# Patient Record
Sex: FEMALE | Race: WHITE | NOT HISPANIC OR LATINO | Employment: FULL TIME | URBAN - METROPOLITAN AREA
[De-identification: names, ages, dates, MRNs, and addresses within clinical notes are randomized per-mention and may not be internally consistent; named-entity substitution may affect disease eponyms.]

---

## 2017-01-11 ENCOUNTER — ROUTINE PRENATAL (OUTPATIENT)
Dept: OBSTETRICS AND GYNECOLOGY | Facility: CLINIC | Age: 32
End: 2017-01-11
Attending: OBSTETRICS & GYNECOLOGY
Payer: COMMERCIAL

## 2017-01-11 VITALS — DIASTOLIC BLOOD PRESSURE: 70 MMHG | WEIGHT: 152.56 LBS | BODY MASS INDEX: 23.2 KG/M2 | SYSTOLIC BLOOD PRESSURE: 110 MMHG

## 2017-01-11 DIAGNOSIS — O09.92 SUPERVISION OF HIGH RISK PREGNANCY, ANTEPARTUM, SECOND TRIMESTER: Primary | ICD-10-CM

## 2017-01-11 PROCEDURE — 0502F SUBSEQUENT PRENATAL CARE: CPT | Mod: S$GLB,,, | Performed by: OBSTETRICS & GYNECOLOGY

## 2017-01-11 PROCEDURE — 99999 PR PBB SHADOW E&M-EST. PATIENT-LVL II: CPT | Mod: PBBFAC,,, | Performed by: OBSTETRICS & GYNECOLOGY

## 2017-01-11 RX ORDER — CYCLOSPORINE 0.5 MG/ML
EMULSION OPHTHALMIC
COMMUNITY
Start: 2016-12-19 | End: 2017-03-29

## 2017-01-11 NOTE — MR AVS SNAPSHOT
[facility] - OB/GYN [address]  [address]  [address]  [facility] [address]  Phone: [phone]  Fax: [phone]                  Samantha Lopez   2017 8:00 AM   Routine Prenatal    Description:  Female : 1985   Provider:  Rasheeda Macias MD   Department:  [facility] - OB/GYN [address]           Reason for Visit     Routine Prenatal Visit           Diagnoses this Visit        Comments    Supervision of high risk pregnancy, antepartum, second trimester    -  Primary            To Do List           Future Appointments        Provider Department Dept Phone    2017 3:15 PM EKG, APPT [facility] [phone]    2017 3:30 PM Be Levine MD [facility]- Cardiology [phone]      Goals (5 Years of Data)     None      Follow-Up and Disposition     Return in about 4 weeks (around 2017).      [facility] On Call     [facility] On Call Nurse Care Line -  Assistance  Registered nurses in the [facility] On Call Center provide clinical advisement, health education, appointment booking, and other advisory services.  Call for this free service at [phone].             Medications           Message regarding Medications     Verify the changes and/or additions to your medication regime listed below are the same as discussed with your clinician today.  If any of these changes or additions are incorrect, please notify your healthcare provider.             Verify that the below list of medications is an accurate representation of the medications you are currently taking.  If none reported, the list may be blank. If incorrect, please contact your healthcare provider. Carry this list with you in case of emergency.           Current Medications     aspirin 81 MG Chew Take 81 mg by mouth once daily.      PNV 11-iron fum-folic acid-om3 (VIVA DHA) 28-1-200 mg Cap Take 1 capsule by mouth once daily.    RESTASIS 0.05 % ophthalmic emulsion     valacyclovir (VALTREX) 500 MG tablet Take 2 tablets (1,000 mg  total) by mouth 2 (two) times daily.           Clinical Reference Information           Prenatal Vitals     Enc. Date GA Prenatal Vitals Prenatal Pulse Pain Level Urine Albumin/Glucose Edema Presentation Dilation/Effacement/Station    17 22w0d 110/70 / 69.2 kg (152 lb 8.9 oz)   0 Negative / Negative None / None / None      16 19w6d 112/74 / 66.3 kg (146 lb 2.6 oz)           16 18w0d 102/74 / 65.3 kg (143 lb 15.4 oz)  / 158 / Absent    None / None      16 14w0d 102/62 / 63 kg (139 lb)  / + / Absent  0 Negative / Negative None / None / None         TW.779 kg (21 lb 8.9 oz)   Pregravid weight: 59.4 kg (131 lb)   Number of fetuses: 1       Vital Signs - Last Recorded  Most recent update: 2017  8:18 AM by Britany Madison LPN    BP Wt LMP BMI       110/70 69.2 kg (152 lb 8.9 oz) 08/10/2016 (Exact Date) 23.2 kg/m2       Allergies as of 2017     No Known Allergies      Immunizations Administered on Date of Encounter - 2017     None

## 2017-01-19 ENCOUNTER — HOSPITAL ENCOUNTER (OUTPATIENT)
Dept: CARDIOLOGY | Facility: CLINIC | Age: 32
Discharge: HOME | End: 2017-01-19
Payer: COMMERCIAL

## 2017-01-19 ENCOUNTER — OFFICE VISIT (OUTPATIENT)
Dept: CARDIOLOGY | Facility: CLINIC | Age: 32
End: 2017-01-19
Payer: COMMERCIAL

## 2017-01-19 VITALS
SYSTOLIC BLOOD PRESSURE: 91 MMHG | HEIGHT: 68 IN | HEART RATE: 77 BPM | OXYGEN SATURATION: 99 % | WEIGHT: 153.88 LBS | BODY MASS INDEX: 23.32 KG/M2 | DIASTOLIC BLOOD PRESSURE: 61 MMHG

## 2017-01-19 DIAGNOSIS — Q24.9 ADULT CONGENITAL HEART DISEASE: Primary | ICD-10-CM

## 2017-01-19 DIAGNOSIS — I37.0 NONRHEUMATIC PULMONARY VALVE STENOSIS: ICD-10-CM

## 2017-01-19 DIAGNOSIS — Q20.1 DOUBLE OUTLET RIGHT VENTRICLE: ICD-10-CM

## 2017-01-19 DIAGNOSIS — Z98.890 PERSONAL HISTORY OF SURGERY TO HEART AND GREAT VESSELS, PRESENTING HAZARDS TO HEALTH: ICD-10-CM

## 2017-01-19 DIAGNOSIS — Z95.2 PULMONARY VALVE REPLACED: ICD-10-CM

## 2017-01-19 PROCEDURE — 99214 OFFICE O/P EST MOD 30 MIN: CPT | Mod: S$GLB,,, | Performed by: PEDIATRICS

## 2017-01-19 PROCEDURE — 99999 PR PBB SHADOW E&M-EST. PATIENT-LVL III: CPT | Mod: PBBFAC,,, | Performed by: PEDIATRICS

## 2017-01-19 PROCEDURE — 1159F MED LIST DOCD IN RCRD: CPT | Mod: S$GLB,,, | Performed by: PEDIATRICS

## 2017-01-19 NOTE — MR AVS SNAPSHOT
[facility]  [address]  [facility] [address]  Phone: [phone]                  Samantha Lopez   2017 3:30 PM   Office Visit    Description:  Female : 1985   Provider:  Be Levine MD   Department:  [facility]                To Do List           Future Appointments        Provider Department Dept Phone    2017 3:20 PM Jose Penn III, MD [facility] - Maternal Fetal Med [phone]    2017 8:00 AM Rasheeda Macias MD [facility] - OB/GYN [address] [phone]      Goals (5 Years of Data)     None      [facility] -  Assistance  Registered nurses in the [facility] Center provide clinical advisement, health education, appointment booking, and other advisory services.  Call for this free service at [phone].             Medications           Message regarding Medications     Verify the changes and/or additions to your medication regime listed below are the same as discussed with your clinician today.  If any of these changes or additions are incorrect, please notify your healthcare provider.             Verify that the below list of medications is an accurate representation of the medications you are currently taking.  If none reported, the list may be blank. If incorrect, please contact your healthcare provider. Carry this list with you in case of emergency.           Current Medications     PNV 11-iron fum-folic acid-om3 (VIVA DHA) 28-1-200 mg Cap Take 1 capsule by mouth once daily.    aspirin 81 MG Chew Take 81 mg by mouth once daily.      RESTASIS 0.05 % ophthalmic emulsion     valacyclovir (VALTREX) 500 MG tablet Take 2 tablets (1,000 mg total) by mouth 2 (two) times daily.           Clinical Reference Information           Prenatal Vitals     Enc. Date GA Prenatal Vitals Prenatal Pulse Pain Level Urine Albumin/Glucose Edema Presentation Dilation/Effacement/Station    17 23w1d 91/61  77       "   17 23w1d 101/62 / 69.8 kg (153 lb 14.1 oz)  77         17 22w0d 110/70 / 69.2 kg (152 lb 8.9 oz)  / + / Present  0 Negative / Negative None / None / None      16 19w6d 112/74 / 66.3 kg (146 lb 2.6 oz)           16 18w0d 102/74 / 65.3 kg (143 lb 15.4 oz)  / 158 / Absent    None / None      16 14w0d 102/62 / 63 kg (139 lb)  / + / Absent  0 Negative / Negative None / None / None         TW.779 kg (21 lb 8.9 oz)   Pregravid weight: 59.4 kg (131 lb)   Number of fetuses: 1       Vital Signs - Last Recorded  Most recent update: 2017  3:51 PM by Nhung Danielle MA    BP Pulse Ht Wt LMP SpO2    91/61 (BP Location: Left arm, Patient Position: Sitting, BP Method: Automatic) 77 5' 8" (1.727 m) 69.8 kg (153 lb 14.1 oz) 08/10/2016 (Exact Date) 99%    BMI                23.4 kg/m2          Blood Pressure          Most Recent Value    Right Arm BP - Sitting  101/62    Left Arm BP - Sitting  91/61    BP  91/61      Allergies as of 2017     No Known Allergies      Immunizations Administered on Date of Encounter - 2017     None      "

## 2017-01-19 NOTE — PROGRESS NOTES
2017    re:Samantha Lopez  :1985    Yessy Fernandez MD  [address] [address]    Dr. Rasheeda Macias    [facility]     Samantha Lopez is a 31 y.o. 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  She underwent a right sided BT shunt at 2 months of age followed by a Rastelli operation at 3 years of age, both performed by Dr. Deleon in [facility].  She subsequently underwent replacement of the right ventricle to pulmonary artery conduit with a 22 mm porcine valve conduit on 2007 in [place], also by Dr. Deleon.  Until last year, she was followed at [place] by Dr. Olivares.    She is now 23 weeks pregnant.  She has no symptoms related to the cardiovascular system.  Specifically, she denies chest pain, syncope, near-syncope, cyanosis, and edema.  She had one episode of brief palpitations at rest while watching a movie.  She thinks this was related to anxiety.  She exercises regularly.  This is her first pregnancy.  She is followed by Dr. Macias at [facility].    The review of systems is as noted above. It is otherwise negative for other symptoms related to the general, neurological, psychiatric, endocrine, gastrointestinal, genitourinary, respiratory, dermatologic, musculoskeletal, hematologic, and immunologic systems.    Past Medical History   Diagnosis Date    Double outlet right ventricle     Migraine headache      Past Surgical History   Procedure Laterality Date    Nasal septum surgery      Kimberley procedure       BT shunt in [facility] by Dr. Deleon at 2 months of age    Rastelli procedure       in [facility] by Dr. Deleon at 3 years of age    Rv to pa conduit  2007     at [place] by Dr. Deleon - 22mm porcine valved conduit     Family History   Problem  "Relation Age of Onset    Breast cancer Maternal Aunt 40     bilateral mastectomy    Stroke Maternal Grandmother     Stroke Maternal Grandfather     Colon cancer Neg Hx     Diabetes Neg Hx     Hypertension Neg Hx     Ovarian cancer Neg Hx     Congenital heart disease Neg Hx      Social History     Social History    Marital status:      Spouse name: N/A    Number of children: N/A    Years of education: N/A     Social History Main Topics    Smoking status: Never Smoker    Smokeless tobacco: Never Used    Alcohol use Yes      Comment: alcohol use socially    Drug use: No    Sexual activity: Yes     Partners: Male     Birth control/ protection: OCP     Other Topics Concern    None     Social History Narrative    She has a PhD from [employer].  She is  and lives in [place].  Her  is an internist at [employer].  She also works at [employer].     Current Outpatient Prescriptions on File Prior to Visit   Medication Sig Dispense Refill    PNV 11-iron fum-folic acid-om3 (VIVA DHA) 28-1-200 mg Cap Take 1 capsule by mouth once daily. 30 each 12    aspirin 81 MG Chew Take 81 mg by mouth once daily.        RESTASIS 0.05 % ophthalmic emulsion       valacyclovir (VALTREX) 500 MG tablet Take 2 tablets (1,000 mg total) by mouth 2 (two) times daily. 10 tablet 0     No current facility-administered medications on file prior to visit.      Review of patient's allergies indicates:  No Known Allergies    Visit Vitals    BP 91/61 (BP Location: Left arm, Patient Position: Sitting, BP Method: Automatic)    Pulse 77    Ht 5' 8" (1.727 m)    Wt 69.8 kg (153 lb 14.1 oz)    LMP 08/10/2016 (Exact Date)    SpO2 99%    BMI 23.4 kg/m2     In general, she is a very healthy-appearing nondysmorphic female in no apparent distress.  The eyes, nares, and oropharynx are clear.  Eyelids and conjunctiva are normal without drainage or erythema.  Pupils equal and round bilaterally.  " The head is normocephalic and atraumatic.  The neck is supple without jugular venous distention or thyroid enlargement.  The lungs are clear to auscultation bilaterally.  There is a well-healed right thoracotomy and median sternotomy scar.  The first heart sound is normal.  The second heart sound is fixed and split.  There is a grade 3/6 systolic ejection murmur heard best at the upper left sternal border with radiation to the lung fields bilaterally.  I do not hear a diastolic murmur.  The abdominal exam is benign (except for a gravid uterus) without hepatosplenomegaly, tenderness, or distention.  Pulses are normal in all 4 extremities with brisk capillary refill and no clubbing, cyanosis, or edema.  No rashes are noted.    I personally reviewed the following tests performed today and my interpretation follows:  EKG reveals normal sinus rhythm with an incomplete right bundle branch block pattern.      Her echocardiogram from 3 months ago revealed:  History of DORV with malposed great vessels and PS S/P Rastelli:  Moderately dilated inferior vena cava  Images suggest bilateral superior vena cavae  Mild  tricuspid insufficiency at velocity suggesting right ventricular pressure >50 mmHg.   Mildly dilated right ventricle with qualitatively good right ventricular systolic  function.  No evidence of residual left to right shunt at intracardiac baffle  Valved conduit from RV to pulmonary arteries with peak velocity <2.9 m/sec., mean gradient <21 mmHg and at least mild insufficiency.  LPA with no obvious stenosis.  RPA difficult to demonstrate  Left atrium is compressed by posterior structures.  Left ventricle is normal in size  Paradoxical septal motion with good movement of the left ventricular free wall,  SF= 39 % and EF estimated 55 -60% from apical four chamber views  The E/e'(lat) is 5, consistent with normal diastolic function.   No evidence of stenosis across VSD with laminar flow throughout intracardiac conduit to  aortic valve.   Trivial to mild aortic insufficiency arising at central jet.   Large ascending aorta arising anteriorly.    I reviewed the echocardiogram and compared it to the results from her evaluation at [place] November 6, 2015.  It appears to be the exact same.  A peak gradient less than 36 mmHg is obtained across the conduit.  The tricuspid insufficiency estimates a right ventricular systolic pressure around 50.  The right ventricle is at most mildly enlarged with good function.  There is mild enlargement of the aortic root, but no aortic insufficiency.    She had CPX testing February 2016.  I reviewed that study.  There were no arrhythmias.  There was no desaturation.  Her exercise tolerance was excellent:  4) The PkVO2 was 40.8 ml/kg/min which is 112% of predicted equating to a functional capacity of 11.7 METS indicating normal functional status.     I also reviewed a cardiac MRI performed February 16, 2016 in [place].  Excellent biventricular function is noted.  There is no significant enlargement of the right ventricle.  Measurements actually suggest a right ventricular volume less than the left ventricular volume, although I do not believe this.  Unrestricted pulmonary valve insufficiency is noted.  The branch pulmonary arteries look good.  Very mild dilation of the aortic root is noted with a diameter less than 4 cm.    Diagnoses:  1.  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  2.  Status post right ventricle to pulmonary artery conduit replacement in 2007 with a 22 mm porcine valved conduit.  3.  Currently with mild to at worst moderate pulmonary stenosis with estimated right ventricular pressures less than half systemic and likely mild pulmonary insufficiency.  There is excellent right ventricular function and at worst mild right  ventricular enlargement.  4.  Very reassuring CPX testing and cardiac MRI 2016 prior to pregnancy.  5.  Mild dilation of the aortic root without aortic insufficiency.  This is typical with her disease and needs to be followed long-term.  6.  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  7.  Now 23 weeks pregnant.    Discussion:  She has very well repaired congenital heart disease.  In the future, she will require replacement of her pulmonary valve, but I hope that this can be accomplished in the catheterization laboratory.  At present, there is no indication for valve replacement as her right ventricle is not significantly enlarged, she has excellent function, the stenosis is not severe, and her insufficiency is no more than mild.  Given her history of congenital heart disease, she is at risk for arrhythmia as well.    She is now 23 weeks pregnant.  We had a long discussion about her pregnancy at the last visit.  Maternal mortality is extremely uncommon with this type of congenital heart disease.  There is a risk of arrhythmias and heart failure symptoms with pregnancy.  I think heart failure is unlikely given her excellent function, but an increased risk of right sided congestion is possible as the volume load increases.  Any significant palpitations or syncope should be immediately evaluated.  Her fetal echo was normal.  There is probably an increased risk of premature delivery.  Vaginal delivery is strongly encouraged unless there are contraindications.  Overall, I expect her to do very well with this pregnancy.  She does require close follow-up with management by high risk obstetrics.    Plan:  1.  Continue baby aspirin for now.  However, I would recommend stopping this medication around 30 weeks gestation.  2.  Endocarditis prophylaxis is recommended prior to dental work.  3.  Continue regular exercise and healthy diet.  4.  Return to clinic in 8 weeks with echo and ekg  5.  Vaginal delivery is preferred from a  cardiac standpoint.  Monitoring with telemetry during delivery and immediately post delivery is recommended.    Thank you for referring this patient to our clinic.  Please call with any questions.    Sincerely,        Be Levine MD  Pediatric Cardiology  Adult Congenital Heart Disease  Pediatric Heart Failure and Transplantation  [facility] [address] [place]  [address]  [phone]

## 2017-02-06 ENCOUNTER — OFFICE VISIT (OUTPATIENT)
Dept: MATERNAL FETAL MEDICINE | Facility: CLINIC | Age: 32
End: 2017-02-06
Attending: OBSTETRICS & GYNECOLOGY
Payer: COMMERCIAL

## 2017-02-06 VITALS
HEIGHT: 68 IN | DIASTOLIC BLOOD PRESSURE: 78 MMHG | BODY MASS INDEX: 23.72 KG/M2 | WEIGHT: 156.5 LBS | SYSTOLIC BLOOD PRESSURE: 112 MMHG

## 2017-02-06 DIAGNOSIS — Q24.9 ADULT CONGENITAL HEART DISEASE: ICD-10-CM

## 2017-02-06 DIAGNOSIS — O09.92 SUPERVISION OF HIGH RISK PREGNANCY, ANTEPARTUM, SECOND TRIMESTER: ICD-10-CM

## 2017-02-06 PROCEDURE — 76816 OB US FOLLOW-UP PER FETUS: CPT | Mod: S$GLB,,, | Performed by: OBSTETRICS & GYNECOLOGY

## 2017-02-06 PROCEDURE — 99212 OFFICE O/P EST SF 10 MIN: CPT | Mod: 25,S$GLB,, | Performed by: OBSTETRICS & GYNECOLOGY

## 2017-02-06 PROCEDURE — 99999 PR PBB SHADOW E&M-EST. PATIENT-LVL II: CPT | Mod: PBBFAC,,, | Performed by: OBSTETRICS & GYNECOLOGY

## 2017-02-06 NOTE — PROGRESS NOTES
Indication: follow-up for fetal growth (TURNER QUESADA).   Maternal age (31 years).   Patient has TOF.   ____________________________________________________________________________  History: Age: 31 years. : 1 Para: 0.   Current Pregnancy: Blood group: O Rhesus positive.  ____________________________________________________________________________  Dating:  LMP: 08/10/16 EDC: 17 GA by LMP: 25w5d  Current Scan on: 17 EDC: 17 GA by current scan: 26w3d  Best Overall Assessment: 17 EDC: 17 Assessed GA: 25w5d  The calculation of the gestational age by current scan was based on BPD, OFD, HC, TCD, AC and FL.  The Best Overall Assessment is based on the LMP.  ____________________________________________________________________________  Growth Scan:  Young gestation.  Biometry:  BPD 66.1 mm 73rd% 26w5d (26w0d to 27w3d)  .8 mm 78th% 27w2d (25w1d to 29w2d)  .5 mm 43rd% 25w5d (25w0d to 26w3d)  FL 44.0 mm 8th% 24w3d (22w3d to 26w4d)  OFD 89.5 mm 89th% 27w0d  TCD 31.0 mm 93rd% 27w6d  CM 6.5 mm 57th%  Left Pelvis 5 mm  Right Pelvis 5 mm  EFW (lbs/oz) 1 lbs 13 ozs  EFW (g) 816 g  30th%     Fetal heart activity: present. Fetal heart rate: 146 bpm.   Fetal presentation: breech.   Amniotic fluid: normal.   Cord: 3 vessels.   Placenta: posterior fundal.     Fetal Anatomy:  Visualized with normal appearance: head, brain, chest, gastrointestinal tract, bladder.  Not examined: neck, skin.  Face: profile normal, nose normal, lip normal.  Spine: sub-opt today but prev seen wnl.  Heart: Visualized and normal appearance: four-chamber view.   Angle: to the fetal left. Four-chamber view: prev seen wnl, septum is sub-opt. Left outflow tract: sub-opt, prev seen wnl. Right outflow tract: sub-opt, prev seen wnl.   patient seen by pediatric cardiologist wnl per patient  Aorta suboptimal but prev seen wnl.  Abdominal Wall: Sub-opt today but prev seen wnl.  Genitalia: do not tell.   Extremities: 4  limbs. Previously seen plantar surface of the feet wnl, previously seen open hands.    Summary of Ultrasound Findings:  Transabdominal US. U/S machine: [employer] E10.     ____________________________________________________________________________  Consultation:    FUV:  by Dr. Macias for maternal CHD    31 G1 INEZ 5/17/17; 25w5d    Prenatal record reviewed  My last note reviewed  Pt interviewed and examined  Ultrasound done  Maternal serum screen wnl declined  Interval pregnancy problems - none  No leaking, bleeding or abnormal discharge  Pt is on LDA    EPIC reviewed    Tetralogy of Falot  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 Until last year, she was followed at [place] by Dr. Olivares.  EKG, echo, CPX and MRI have all been reassuring  Last visit with Dr. Levine on 1/17/17 - he will see them again in 8 weeks  Fetal echo with Dr. 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 Now working at [employer]  Her  is an Internist at [employer].  ____________________________________________________________________________  Maternal Assessment:  Followup examination.   Weight: 156 lb (71.0 kg).   Blood pressure: 112 / 78 mmHg.   ____________________________________________________________________________  Report Summary:  Impression:   Normal interval fetal growth  Normal fetal ultrasound  No obvious fetal abnormalities  Normal amniotic fluid volume  Normal placental location; no evidence of placenta previa  CHD s/p repair; NYHA Class I followed by Cards  Questions about timing of delivery and route of delivery reviewed - at present we are thinking vaginal delivery at term  Pt will be meeting with Dr. Levine later in next 6 weeks and will decide whether to deliver in [place] where her folks live  (and her original CHD physician) or to deliver here at [facility], or ?     Recommendations:   With your permission, we would like to re-evaluate fetal growth and surveillance and maternal health in about 8 weeks  Pt will be meeting again with Dr. Levine to reassess timing of delivery

## 2017-02-07 ENCOUNTER — PATIENT MESSAGE (OUTPATIENT)
Dept: CARDIOLOGY | Facility: CLINIC | Age: 32
End: 2017-02-07

## 2017-02-20 ENCOUNTER — PATIENT MESSAGE (OUTPATIENT)
Dept: OBSTETRICS AND GYNECOLOGY | Facility: CLINIC | Age: 32
End: 2017-02-20

## 2017-02-20 DIAGNOSIS — O09.92 SUPERVISION OF HIGH RISK PREGNANCY, ANTEPARTUM, SECOND TRIMESTER: Primary | ICD-10-CM

## 2017-02-24 ENCOUNTER — LAB VISIT (OUTPATIENT)
Dept: LAB | Facility: HOSPITAL | Age: 32
End: 2017-02-24
Attending: OBSTETRICS & GYNECOLOGY
Payer: COMMERCIAL

## 2017-02-24 DIAGNOSIS — O09.92 SUPERVISION OF HIGH RISK PREGNANCY, ANTEPARTUM, SECOND TRIMESTER: ICD-10-CM

## 2017-02-24 LAB
BASOPHILS # BLD AUTO: 0.02 K/UL
BASOPHILS NFR BLD: 0.2 %
DIFFERENTIAL METHOD: ABNORMAL
EOSINOPHIL # BLD AUTO: 0 K/UL
EOSINOPHIL NFR BLD: 0.5 %
ERYTHROCYTE [DISTWIDTH] IN BLOOD BY AUTOMATED COUNT: 13.2 %
GLUCOSE SERPL-MCNC: 94 MG/DL
HCT VFR BLD AUTO: 36.1 %
HGB BLD-MCNC: 12.5 G/DL
LYMPHOCYTES # BLD AUTO: 1.2 K/UL
LYMPHOCYTES NFR BLD: 13.8 %
MCH RBC QN AUTO: 31 PG
MCHC RBC AUTO-ENTMCNC: 34.6 %
MCV RBC AUTO: 90 FL
MONOCYTES # BLD AUTO: 0.4 K/UL
MONOCYTES NFR BLD: 4.8 %
NEUTROPHILS # BLD AUTO: 6.8 K/UL
NEUTROPHILS NFR BLD: 79.8 %
PLATELET # BLD AUTO: 171 K/UL
PMV BLD AUTO: 10.2 FL
RBC # BLD AUTO: 4.03 M/UL
WBC # BLD AUTO: 8.54 K/UL

## 2017-02-24 PROCEDURE — 36415 COLL VENOUS BLD VENIPUNCTURE: CPT

## 2017-02-24 PROCEDURE — 82950 GLUCOSE TEST: CPT

## 2017-02-24 PROCEDURE — 85025 COMPLETE CBC W/AUTO DIFF WBC: CPT

## 2017-03-06 ENCOUNTER — PATIENT MESSAGE (OUTPATIENT)
Dept: CARDIOLOGY | Facility: CLINIC | Age: 32
End: 2017-03-06

## 2017-03-06 ENCOUNTER — PATIENT MESSAGE (OUTPATIENT)
Dept: OBSTETRICS AND GYNECOLOGY | Facility: CLINIC | Age: 32
End: 2017-03-06

## 2017-03-06 ENCOUNTER — PATIENT MESSAGE (OUTPATIENT)
Dept: MATERNAL FETAL MEDICINE | Facility: CLINIC | Age: 32
End: 2017-03-06

## 2017-03-16 ENCOUNTER — HOSPITAL ENCOUNTER (OUTPATIENT)
Dept: CARDIOLOGY | Facility: CLINIC | Age: 32
Discharge: HOME | End: 2017-03-16
Payer: COMMERCIAL

## 2017-03-16 ENCOUNTER — OFFICE VISIT (OUTPATIENT)
Dept: CARDIOLOGY | Facility: CLINIC | Age: 32
End: 2017-03-16
Payer: COMMERCIAL

## 2017-03-16 VITALS
DIASTOLIC BLOOD PRESSURE: 62 MMHG | WEIGHT: 161.81 LBS | HEART RATE: 80 BPM | OXYGEN SATURATION: 97 % | SYSTOLIC BLOOD PRESSURE: 96 MMHG | BODY MASS INDEX: 23.97 KG/M2 | HEIGHT: 69 IN

## 2017-03-16 DIAGNOSIS — Z95.2 PULMONARY VALVE REPLACED: ICD-10-CM

## 2017-03-16 DIAGNOSIS — Z98.890 PERSONAL HISTORY OF SURGERY TO HEART AND GREAT VESSELS, PRESENTING HAZARDS TO HEALTH: ICD-10-CM

## 2017-03-16 DIAGNOSIS — I37.0 NONRHEUMATIC PULMONARY VALVE STENOSIS: ICD-10-CM

## 2017-03-16 DIAGNOSIS — Q24.9 ADULT CONGENITAL HEART DISEASE: Primary | ICD-10-CM

## 2017-03-16 DIAGNOSIS — Q20.1 DOUBLE OUTLET RIGHT VENTRICLE: ICD-10-CM

## 2017-03-16 DIAGNOSIS — Q24.9 ADULT CONGENITAL HEART DISEASE: ICD-10-CM

## 2017-03-16 LAB
AORTIC VALVE REGURGITATION: ABNORMAL
DIASTOLIC DYSFUNCTION: NO
ESTIMATED PA SYSTOLIC PRESSURE: 54.76
RETIRED EF AND QEF - SEE NOTES: 55 (ref 55–65)
TRICUSPID VALVE REGURGITATION: ABNORMAL

## 2017-03-16 PROCEDURE — 93320 DOPPLER ECHO COMPLETE: CPT | Mod: S$GLB,,, | Performed by: PEDIATRICS

## 2017-03-16 PROCEDURE — 99999 PR PBB SHADOW E&M-EST. PATIENT-LVL III: CPT | Mod: PBBFAC,,, | Performed by: PEDIATRICS

## 2017-03-16 PROCEDURE — 93303 ECHO TRANSTHORACIC: CPT | Mod: S$GLB,,, | Performed by: PEDIATRICS

## 2017-03-16 PROCEDURE — 93325 DOPPLER ECHO COLOR FLOW MAPG: CPT | Mod: S$GLB,,, | Performed by: PEDIATRICS

## 2017-03-16 PROCEDURE — 1160F RVW MEDS BY RX/DR IN RCRD: CPT | Mod: S$GLB,,, | Performed by: PEDIATRICS

## 2017-03-16 PROCEDURE — 99214 OFFICE O/P EST MOD 30 MIN: CPT | Mod: S$GLB,,, | Performed by: PEDIATRICS

## 2017-03-16 PROCEDURE — 93000 ELECTROCARDIOGRAM COMPLETE: CPT | Mod: S$GLB,,, | Performed by: INTERNAL MEDICINE

## 2017-03-16 NOTE — MR AVS SNAPSHOT
[facility]  [address]  [facility] [address]  Phone: [phone]                  Samantha Lopez   3/16/2017 3:30 PM   Office Visit    Description:  Female : 1985   Provider:  Be Levine MD   Department:  [facility]           Diagnoses this Visit        Comments    Adult congenital heart disease    -  Primary     Double outlet right ventricle         Nonrheumatic pulmonary valve stenosis         Pulmonary valve replaced         Personal history of surgery to heart and great vessels, presenting hazards to health                To Do List           Future Appointments        Provider Department Dept Phone    3/29/2017 11:15 AM Rasheeda Macias MD [facility] - OB/GYN [address] [phone]    4/3/2017 3:00 PM ULTRASOUND, [facility] 4TH FLR ON CALL [facility] Maternal Fetal Med [phone]      Goals (5 Years of Data)     None      [facility]     [facility] - 24/7 Assistance  Registered nurses in the [facility] provide clinical advisement, health education, appointment booking, and other advisory services.  Call for this free service at [phone].             Medications           Message regarding Medications     Verify the changes and/or additions to your medication regime listed below are the same as discussed with your clinician today.  If any of these changes or additions are incorrect, please notify your healthcare provider.        STOP taking these medications     aspirin 81 MG Chew Take 81 mg by mouth once daily.             Verify that the below list of medications is an accurate representation of the medications you are currently taking.  If none reported, the list may be blank. If incorrect, please contact your healthcare provider. Carry this list with you in case of emergency.           Current Medications     PNV 11-iron fum-folic acid-om3 (VIVA DHA) 28-1-200 mg Cap Take 1 capsule by mouth once daily.    RESTASIS 0.05 %  "ophthalmic emulsion     valacyclovir (VALTREX) 500 MG tablet Take 2 tablets (1,000 mg total) by mouth 2 (two) times daily.           Clinical Reference Information           Prenatal Vitals     Enc. Date GA Prenatal Vitals Prenatal Pulse Pain Level Urine Albumin/Glucose Edema Presentation Dilation/Effacement/Station    3/16/17 31w1d 96/62  80         3/16/17 31w1d 108/72 / 73.4 kg (161 lb 13.1 oz)  80         17 25w5d 112/78 / 71 kg (156 lb 8.4 oz)           17 22w0d 110/70 / 69.2 kg (152 lb 8.9 oz)  / + / Present  0 Negative / Negative None / None / None      16 19w6d 112/74 / 66.3 kg (146 lb 2.6 oz)           16 18w0d 102/74 / 65.3 kg (143 lb 15.4 oz)  / 158 / Absent    None / None      16 14w0d 102/62 / 63 kg (139 lb)  / + / Absent  0 Negative / Negative None / None / None         TW.6 kg (25 lb 8.4 oz)   Pregravid weight: 59.4 kg (131 lb)   Number of fetuses: 1   Height: 5' 8" (1.727 m)   BMI: 19.9       Your Vitals Were     BP Pulse Height Weight Last Period SpO2     (BP Location: Left arm, Patient Position: Sitting, BP Method: Automatic) 80 5' 8.5" (1.74 m) 73.4 kg (161 lb 13.1 oz) 08/10/2016 (Exact Date) 97%    BMI                24.25 kg/m2          Blood Pressure          Most Recent Value    Right Arm BP - Sitting  108/72    Left Arm BP - Sitting  96/62    BP  96/62      Allergies as of 3/16/2017     No Known Allergies      Immunizations Administered on Date of Encounter - 3/16/2017     None      Language Assistance Services     ATTENTION: Language assistance services are available, free of charge. Please call [phone].      ATENCIÓN: Si habla isaac, tiene a echevarria disposición servicios gratuitos de asistencia lingüística. Llame al [phone].     CHÚ Ý: N?u b?n nói Ti?ng Vi?t, có các d?ch v? h? tr? ngôn ng? mi?n phí dành cho b?n. G?i s? [phone].         [facility] complies with applicable Federal civil rights laws and does not discriminate on " the basis of race, color, national origin, age, disability, or sex.

## 2017-03-17 NOTE — PROGRESS NOTES
2017    re:Samantha Lopez  :1985    Yessy Fernandez MD  [address] [facility] [address]    Art Macias    [facility]     Samantha Lopez is a 31 y.o. 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  She underwent a right sided BT shunt at 2 months of age followed by a Rastelli operation at 3 years of age, both performed by Dr. Deleon in [facility].  She subsequently underwent replacement of the right ventricle to pulmonary artery conduit with a 22 mm porcine valve conduit on 2007 at [place] in [facility], also by Dr. Deleon.  Until last year, she was followed at [place] by Dr. Olivares.    She is now 31 weeks pregnant.  She has no symptoms related to the cardiovascular system.  Specifically, she denies chest pain, syncope, near-syncope, cyanosis.  She occasionally feels like her feet are swollen, but her  has not noticed this and there has been no pitting.  She exercises regularly.  This is her first pregnancy.      The review of systems is as noted above. It is otherwise negative for other symptoms related to the general, neurological, psychiatric, endocrine, gastrointestinal, genitourinary, respiratory, dermatologic, musculoskeletal, hematologic, and immunologic systems.    Past Medical History:   Diagnosis Date    Double outlet right ventricle     Migraine headache      Past Surgical History:   Procedure Laterality Date    ROSENDO PROCEDURE      BT shunt in [facility] by Dr. Deleon at 2 months of age    NASAL SEPTUM SURGERY      RASTELLI PROCEDURE      in [facility] by Dr. Deleon at 3 years of age    RV to PA conduit  2007    at [place] by Dr. Deleon - 22mm porcine valved conduit     Family History   Problem Relation Age of Onset  "   Breast cancer Maternal Aunt 40     bilateral mastectomy    Stroke Maternal Grandmother     Stroke Maternal Grandfather     Colon cancer Neg Hx     Diabetes Neg Hx     Hypertension Neg Hx     Ovarian cancer Neg Hx     Congenital heart disease Neg Hx      Social History     Social History    Marital status:      Spouse name: N/A    Number of children: N/A    Years of education: N/A     Social History Main Topics    Smoking status: Never Smoker    Smokeless tobacco: Never Used    Alcohol use Yes      Comment: alcohol use socially    Drug use: No    Sexual activity: Yes     Partners: Male     Birth control/ protection: OCP     Other Topics Concern    None     Social History Narrative    She has a PhD from [employer].  She is  and lives in [place].  Her  is an internist at [employer] [place].  She also works at [employer].     Current Outpatient Prescriptions on File Prior to Visit   Medication Sig Dispense Refill    PNV 11-iron fum-folic acid-om3 (VIVA DHA) 28-1-200 mg Cap Take 1 capsule by mouth once daily. 30 each 12    RESTASIS 0.05 % ophthalmic emulsion       aspirin 81 MG Chew Take 81 mg by mouth once daily.        valacyclovir (VALTREX) 500 MG tablet Take 2 tablets (1,000 mg total) by mouth 2 (two) times daily. 10 tablet 0     No current facility-administered medications on file prior to visit.      Review of patient's allergies indicates:  No Known Allergies    BP 96/62 (BP Location: Left arm, Patient Position: Sitting, BP Method: Automatic)  Pulse 80  Ht 5' 8.5" (1.74 m)  Wt 73.4 kg (161 lb 13.1 oz)  LMP 08/10/2016 (Exact Date)  SpO2 97%  BMI 24.25 kg/m2  In general, she is a very healthy-appearing nondysmorphic female in no apparent distress.  The eyes, nares, and oropharynx are clear.  Eyelids and conjunctiva are normal without drainage or erythema.  Pupils equal and round bilaterally.  The head is normocephalic and atraumatic.  The neck " is supple without jugular venous distention or thyroid enlargement.  The lungs are clear to auscultation bilaterally.  There is a well-healed right thoracotomy and median sternotomy scar.  The first heart sound is normal.  The second heart sound is fixed and split.  There is a grade 3/6 systolic ejection murmur heard best at the upper left sternal border with radiation to the lung fields bilaterally.  I do not hear a diastolic murmur.  The abdominal exam is benign (except for a gravid uterus) without hepatosplenomegaly, tenderness, or distention.  Pulses are normal in all 4 extremities with brisk capillary refill and no clubbing, cyanosis, or edema.  No rashes are noted.    I personally reviewed the following tests performed today and my interpretation follows:  EKG reveals normal sinus rhythm with an incomplete right bundle branch block pattern.  It is unchanged    Her echocardiogram reveals:  History of DORV with malposed great vessels and PS S/P Rastelli:  Technically challenging study now 31 weeks EGA with study demonstrating no significant change from previous-  Moderately dilated inferior vena cava and right atrium  Mild  tricuspid insufficiency at velocity suggesting right ventricular pressure around mmHg.   Mildly dilated right ventricle with qualitatively good right ventricular systolic  function.  No evidence of residual left to right shunt at intracardiac baffle  Valved conduit from RV to pulmonary arteries with peak velocity <2.7 m/sec., mean gradient <18 mmHg and at least mild insufficiency.  LPA with no obvious stenosis.  RPA difficult to demonstrate  Left atrium is compressed by posterior structures.  Left ventricle is normal in size  Paradoxical septal motion with good movement of the left ventricular free wall,  SF= 37% and EF estimated 55-60% from apical four chamber views  The E/e'(lat) is 6, consistent with normal diastolic function.   No evidence of stenosis across VSD with laminar flow  throughout intracardiac conduit to aortic valve.   Mild aortic insufficiency arising at central jet.   Large ascending aorta arising anteriorly.    I reviewed the echocardiogram and compared it to the results from her evaluation at [place] November 6, 2015.  It appears to be the exact same.  A peak gradient less than 36 mmHg is obtained across the conduit.  The tricuspid insufficiency estimates a right ventricular systolic pressure around 50.  The right ventricle is at most mildly enlarged with good function.  There is mild enlargement of the aortic root, but no aortic insufficiency.    She had CPX testing February 2016.  I reviewed that study.  There were no arrhythmias.  There was no desaturation.  Her exercise tolerance was excellent:  4) The PkVO2 was 40.8 ml/kg/min which is 112% of predicted equating to a functional capacity of 11.7 METS indicating normal functional status.     I also reviewed a cardiac MRI performed February 16, 2016 in [place].  Excellent biventricular function is noted.  There is no significant enlargement of the right ventricle.  Measurements actually suggest a right ventricular volume less than the left ventricular volume, although I do not believe this.  Unrestricted pulmonary valve insufficiency is noted.  The branch pulmonary arteries look good.  Very mild dilation of the aortic root is noted with a diameter less than 4 cm.    Diagnoses:  1.  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  2.  Status post right ventricle to pulmonary artery conduit replacement in 2007 with a 22 mm porcine valved conduit.  3.  Currently with mild to at worst moderate pulmonary stenosis with estimated right ventricular pressures about half systemic and likely mild pulmonary insufficiency.  There is excellent right ventricular function and at  worst mild right ventricular enlargement.  4.  Very reassuring CPX testing and cardiac MRI 2016 prior to pregnancy.  5.  Mild dilation of the aortic root without aortic insufficiency.  This is typical with her disease and needs to be followed long-term.  6.  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  7.  Now 31 weeks pregnant.    Discussion:  She has very well repaired congenital heart disease.  In the future, she will require replacement of her pulmonary valve, but I hope that this can be accomplished in the catheterization laboratory.  At present, there is no indication for valve replacement as her right ventricle is not significantly enlarged, she has excellent function, the stenosis is not severe, and her insufficiency is no more than mild.  Given her history of congenital heart disease, she is at risk for arrhythmia as well.    She is now 31 weeks pregnant.  We had a long discussion about her pregnancy once again.  Maternal mortality is extremely uncommon with this type of congenital heart disease.  There is a risk of arrhythmia and heart failure symptoms with pregnancy.  Heart failure is very unlikely given her excellent function, but an increased risk of right sided congestion is possible as the volume load increases.  Any significant palpitations or syncope should be immediately evaluated.  Her fetal echo was normal.  There is probably an increased risk of premature delivery.  Vaginal delivery is strongly encouraged unless there are contraindications.  I am fine with her laboring normally.  She does not want an epidural, and I am fine with this unless OB feels differently.  Overall, I expect her to continue to do very well with this pregnancy.  She does require close follow-up with management by high risk obstetrics.    Plan:  1.  discontinue aspirin  2.  Endocarditis prophylaxis is recommended prior to dental work.  3.  Continue regular exercise and healthy diet.  4.  follow up as scheduled with OB - if she  is going to be seen down here before delivery, I will see her with no tests at that time.  I will see her in the hospital if she delivers in [place].  If not, I would like to see her about 6 weeks after delivery with a repeat echo and ekg.  5.  Vaginal delivery is preferred from a cardiac standpoint.  Monitoring with telemetry during delivery and immediately post delivery is recommended.    Thank you for referring this patient to our clinic.  Please call with any questions.    Sincerely,        Be Levine MD  Pediatric Cardiology  Adult Congenital Heart Disease  Pediatric Heart Failure and Transplantation  [facility] [address] [place]  [address]  [phone]

## 2017-03-29 ENCOUNTER — ROUTINE PRENATAL (OUTPATIENT)
Dept: OBSTETRICS AND GYNECOLOGY | Facility: CLINIC | Age: 32
End: 2017-03-29
Attending: OBSTETRICS & GYNECOLOGY
Payer: COMMERCIAL

## 2017-03-29 VITALS
WEIGHT: 160.94 LBS | SYSTOLIC BLOOD PRESSURE: 108 MMHG | BODY MASS INDEX: 24.11 KG/M2 | DIASTOLIC BLOOD PRESSURE: 68 MMHG

## 2017-03-29 DIAGNOSIS — D22.9 NEVUS: Primary | ICD-10-CM

## 2017-03-29 PROCEDURE — 99999 PR PBB SHADOW E&M-EST. PATIENT-LVL II: CPT | Mod: PBBFAC,,, | Performed by: OBSTETRICS & GYNECOLOGY

## 2017-03-29 PROCEDURE — 0502F SUBSEQUENT PRENATAL CARE: CPT | Mod: S$GLB,,, | Performed by: OBSTETRICS & GYNECOLOGY

## 2017-03-29 NOTE — MR AVS SNAPSHOT
[facility] OB/[facility] [address]  [address]  [address]  [facility] [address]  Phone: [phone]  Fax: [phone]                  Samantha Lopez   3/29/2017 11:15 AM   Routine Prenatal    Description:  Female : 1985   Provider:  Rasheeda Macias MD   Department:  [facility] - OB/GYN [address]           Reason for Visit     Routine Prenatal Visit     Vaginal Pain     Vaginal Itching           Diagnoses this Visit        Comments    Nevus    -  Primary            To Do List           Future Appointments        Provider Department Dept Phone    4/3/2017 3:00 PM ULTRASOUND, [facility] 4TH FLR ON CALL [facility] Maternal Fetal Med [phone]      Goals (5 Years of Data)     None      Follow-Up and Disposition     Return in about 2 weeks (around 2017).      [facility]     [facility] Nurse Care Line -  Assistance  Registered nurses in the [facility] provide clinical advisement, health education, appointment booking, and other advisory services.  Call for this free service at [phone].             Medications           Message regarding Medications     Verify the changes and/or additions to your medication regime listed below are the same as discussed with your clinician today.  If any of these changes or additions are incorrect, please notify your healthcare provider.        STOP taking these medications     RESTASIS 0.05 % ophthalmic emulsion            Verify that the below list of medications is an accurate representation of the medications you are currently taking.  If none reported, the list may be blank. If incorrect, please contact your healthcare provider. Carry this list with you in case of emergency.           Current Medications     PNV 11-iron fum-folic acid-om3 (VIVA DHA) 28-1-200 mg Cap Take 1 capsule by mouth once daily.    valacyclovir (VALTREX) 500 MG tablet Take 2 tablets (1,000 mg total) by mouth 2 (two) times daily.           Clinical Reference Information     "       Prenatal Vitals     Enc. Date GA Prenatal Vitals Prenatal Pulse Pain Level Urine Albumin/Glucose Edema Presentation Dilation/Effacement/Station    3/29/17 33w0d 108/68 / 73 kg (160 lb 15 oz)  /  / Present  7 Negative / Negative       17 25w5d 112/78 / 71 kg (156 lb 8.4 oz)           17 22w0d 110/70 / 69.2 kg (152 lb 8.9 oz)  / + / Present  0 Negative / Negative None / None / None      16 19w6d 112/74 / 66.3 kg (146 lb 2.6 oz)           16 18w0d 102/74 / 65.3 kg (143 lb 15.4 oz)  / 158 / Absent    None / None      16 14w0d 102/62 / 63 kg (139 lb)  / + / Absent  0 Negative / Negative None / None / None         TW.6 kg (29 lb 15 oz)   Pregravid weight: 59.4 kg (131 lb)   Number of fetuses: 1   Height: 5' 8" (1.727 m)   BMI: 19.9       Your Vitals Were     BP Weight Last Period BMI       108/68 73 kg (160 lb 15 oz) 08/10/2016 (Exact Date) 24.11 kg/m2       Allergies as of 3/29/2017     No Known Allergies      Immunizations Administered on Date of Encounter - 3/29/2017     None      Language Assistance Services     ATTENTION: Language assistance services are available, free of charge. Please call [phone].      ATENCIÓN: Si habla español, tiene a echevarria disposición servicios gratuitos de asistencia lingüística. Llame al [phone].     CHÚ Ý: N?u b?n nói Ti?ng Vi?t, có các d?ch v? h? tr? ngôn ng? mi?n phí dành cho b?n. G?i s? [phone].         [facility] - [facility] [address] complies with applicable Federal civil rights laws and does not discriminate on the basis of race, color, national origin, age, disability, or sex.        "

## 2017-03-30 ENCOUNTER — PATIENT MESSAGE (OUTPATIENT)
Dept: OBSTETRICS AND GYNECOLOGY | Facility: CLINIC | Age: 32
End: 2017-03-30

## 2017-04-03 ENCOUNTER — OFFICE VISIT (OUTPATIENT)
Dept: MATERNAL FETAL MEDICINE | Facility: CLINIC | Age: 32
End: 2017-04-03
Attending: OBSTETRICS & GYNECOLOGY
Payer: COMMERCIAL

## 2017-04-03 VITALS
SYSTOLIC BLOOD PRESSURE: 102 MMHG | BODY MASS INDEX: 24.48 KG/M2 | WEIGHT: 163.38 LBS | DIASTOLIC BLOOD PRESSURE: 72 MMHG

## 2017-04-03 DIAGNOSIS — O09.92 SUPERVISION OF HIGH RISK PREGNANCY, ANTEPARTUM, SECOND TRIMESTER: ICD-10-CM

## 2017-04-03 DIAGNOSIS — Q24.9 ADULT CONGENITAL HEART DISEASE: ICD-10-CM

## 2017-04-03 PROCEDURE — 76816 OB US FOLLOW-UP PER FETUS: CPT | Mod: S$GLB,,, | Performed by: OBSTETRICS & GYNECOLOGY

## 2017-04-03 PROCEDURE — 99999 PR PBB SHADOW E&M-EST. PATIENT-LVL II: CPT | Mod: PBBFAC,,,

## 2017-04-03 PROCEDURE — 99213 OFFICE O/P EST LOW 20 MIN: CPT | Mod: 25,S$GLB,, | Performed by: OBSTETRICS & GYNECOLOGY

## 2017-04-03 PROCEDURE — 1160F RVW MEDS BY RX/DR IN RCRD: CPT | Mod: S$GLB,,, | Performed by: OBSTETRICS & GYNECOLOGY

## 2017-04-03 NOTE — PROGRESS NOTES
31 G1 INEZ 5/17/17; 25w5d    Prenatal record reviewed  My last note reviewed  Pt interviewed and examined  Ultrasound done  Maternal serum screen wnl declined  Interval pregnancy problems - none  No leaking, bleeding or abnormal discharge  Pt is on LDA    EPIC reviewed    Tetralogy of Falot  Seen by Dr. Tom Young [facility] for evaluation of surgically repaired double outlet right ventricle with  d-transposition of the great arteries, pulmonary stenosis, and VSD. Until last year, she was followed at [place] by Dr. Olivares.  EKG, echo, CPX and MRI have all been reassuring  Last visit with Dr. Levine on 3/16/17 - he feels she can labor without worry about decompensation  Fetal echo with Dr. 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 Now working at [employer]  Her  is an Internist at [employer].    Impression  =========    CHD s/p repair; NYHA Class I followed by Cards  Questions about timing of delivery and route of delivery reviewed - at present we are thinking vaginal delivery at term  Normal fetal growth,  Normal fetal ultrasound,  Normal placental location,  Normal Amniotic fluid volume.    Recommendation  ==============    Please make sure pt is scheduled to meet with OB Anesthesia prior to delivery  With your permission, we would like to re-evaluate fetal growth and surveillance and maternal health in 4 weeks.  Could plan induction after 39 weeks if cervix is ready  Would not go past INEZ

## 2017-04-03 NOTE — LETTER
April 3, 2017      Rasheeda Macias MD  [address]  [address]  [place] [facility] [address]           [facility]aternal [facility]  [address]  [facility] [address]  Phone: [phone]          Patient: Samantha Lopez   MR Number: [id number]   YOB: 1985   Date of Visit: 4/3/2017       Dear Dr. Rasheeda Macias:    Thank you for referring Samantha Lopez to me for evaluation. Attached you will find relevant portions of my assessment and plan of care.    If you have questions, please do not hesitate to call me. I look forward to following Samantha Lopez along with you.    Sincerely,    Jose Penn III, MD    Enclosure  CC:  No Recipients    If you would like to receive this communication electronically, please contact [email or web address] or [phone] to request more information on [email or web address] Link access.    For providers and/or their staff who would like to refer a patient to [facility], please contact us through our one-stop-shop provider referral line, [facility] , at [phone].    If you feel you have received this communication in error or would no longer like to receive these types of communications, please e-mail [email or web address]

## 2017-04-05 ENCOUNTER — PATIENT MESSAGE (OUTPATIENT)
Dept: OBSTETRICS AND GYNECOLOGY | Facility: CLINIC | Age: 32
End: 2017-04-05

## 2017-04-06 ENCOUNTER — PATIENT MESSAGE (OUTPATIENT)
Dept: MATERNAL FETAL MEDICINE | Facility: CLINIC | Age: 32
End: 2017-04-06

## 2017-04-11 ENCOUNTER — ROUTINE PRENATAL (OUTPATIENT)
Dept: OBSTETRICS AND GYNECOLOGY | Facility: CLINIC | Age: 32
End: 2017-04-11
Payer: COMMERCIAL

## 2017-04-11 ENCOUNTER — OFFICE VISIT (OUTPATIENT)
Dept: ANESTHESIOLOGY | Facility: OTHER | Age: 32
End: 2017-04-11
Attending: OBSTETRICS & GYNECOLOGY
Payer: COMMERCIAL

## 2017-04-11 ENCOUNTER — CLINICAL SUPPORT (OUTPATIENT)
Dept: OBSTETRICS AND GYNECOLOGY | Facility: CLINIC | Age: 32
End: 2017-04-11
Attending: OBSTETRICS & GYNECOLOGY
Payer: COMMERCIAL

## 2017-04-11 VITALS
WEIGHT: 164.44 LBS | DIASTOLIC BLOOD PRESSURE: 76 MMHG | SYSTOLIC BLOOD PRESSURE: 102 MMHG | BODY MASS INDEX: 24.64 KG/M2

## 2017-04-11 DIAGNOSIS — Z3A.34 34 WEEKS GESTATION OF PREGNANCY: Primary | ICD-10-CM

## 2017-04-11 DIAGNOSIS — Z23 NEED FOR TDAP VACCINATION: Primary | ICD-10-CM

## 2017-04-11 PROCEDURE — 0502F SUBSEQUENT PRENATAL CARE: CPT | Mod: S$GLB,,, | Performed by: NURSE PRACTITIONER

## 2017-04-11 PROCEDURE — 99999 PR PBB SHADOW E&M-EST. PATIENT-LVL II: CPT | Mod: PBBFAC,,, | Performed by: NURSE PRACTITIONER

## 2017-04-11 PROCEDURE — 90715 TDAP VACCINE 7 YRS/> IM: CPT | Mod: S$GLB,,, | Performed by: OBSTETRICS & GYNECOLOGY

## 2017-04-11 PROCEDURE — 99999 PR PBB SHADOW E&M-EST. PATIENT-LVL I: CPT | Mod: PBBFAC,,,

## 2017-04-11 PROCEDURE — 90471 IMMUNIZATION ADMIN: CPT | Mod: S$GLB,,, | Performed by: OBSTETRICS & GYNECOLOGY

## 2017-04-11 NOTE — CONSULTS
[facility]    Date:    04/11/2017  10:52 AM     Anesthesia Consult: outpatient    Initial Consultation: Yes    Requested by: Obstetrician / MFM  Consult documentation sent back to physician.      Chief Complaint: History of surgically corrected asymptomatic congenital heart disease     Diagnosis: Healthy IUP at 35 weeks EGA    Reason for Consult: Anesthetic recommendations for delivery    Allergies:  Review of patient's allergies indicates no known allergies.    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 She underwent a right sided BT shunt at 2 months of age followed by a Rastelli operation at 3 years of age, both performed by Dr. Deleon in [facility]. She subsequently underwent replacement of the right ventricle to pulmonary artery conduit with a 22 mm porcine valve conduit on June 7, 2007 at [place] in [facility], also by Dr. Deleon. She is followed by Dr Be Levine from the congenital heart disease clinic at [facility] (see last note from March 16th).   She reports a healthy pregnancy for her and her baby. She has no symptoms related to the cardiovascular system. Specifically, she denies chest pain, syncope, near-syncope, cyanosis. She occasionally feels tired but goes on walks with her dogs at least 2-3 times a week for 30 minutes.  Her echocardiogram reveals:  History of DORV with malposed great vessels and PS S/P Rastelli:  Technically challenging study now 31 weeks EGA with study demonstrating no significant change from previous-  Moderately dilated inferior vena cava and right atrium  Mild  tricuspid insufficiency at velocity suggesting right ventricular pressure around mmHg.   Mildly dilated right ventricle with qualitatively good right ventricular systolic  function.  No evidence of residual left  to right shunt at intracardiac baffle  Valved conduit from RV to pulmonary arteries with peak velocity <2.7 m/sec., mean gradient <18 mmHg and at least mild insufficiency.  LPA with no obvious stenosis.  RPA difficult to demonstrate  Left atrium is compressed by posterior structures.  Left ventricle is normal in size  Paradoxical septal motion with good movement of the left ventricular free wall,  SF= 37% and EF estimated 55-60% from apical four chamber views  The E/e'(lat) is 6, consistent with normal diastolic function.   No evidence of stenosis across VSD with laminar flow throughout intracardiac conduit to aortic valve.   Mild aortic insufficiency arising at central jet.   Large ascending aorta arising anteriorly.      She also had CPX testing February 2016. There were no arrhythmias. There was no desaturation. Her exercise tolerance was excellent:  4) The PkVO2 was 40.8 ml/kg/min which is 112% of predicted equating to a functional capacity of 11.7 METS indicating normal functional status.      Cardiac MRI was performed on February 16, 2016 in [place]. Excellent biventricular function is noted. There is no significant enlargement of the right ventricle. Measurements actually suggest a right ventricular volume less than the left ventricular volume, although I do not believe this. Unrestricted pulmonary valve insufficiency is noted. The branch pulmonary arteries look good. Very mild dilation of the aortic root is noted with a diameter less than 4 cm.    PAIN     Pain Scale:0/10 None    Past medical history:    Past Medical History:   Diagnosis Date    Double outlet right ventricle     Migraine headache        Past surgical history:    Past Surgical History:   Procedure Laterality Date    ROSENDO PROCEDURE      BT shunt in [facility] by Dr. Deleon at 2 months of age    NASAL SEPTUM SURGERY      RASTELLI PROCEDURE      in [facility] by Dr. Deleon at 3 years of age    RV to PA conduit  06/07/2007     at [place] by Dr. Deleon - 22mm porcine valved conduit       Family history:    Family History   Problem Relation Age of Onset    Breast cancer Maternal Aunt 40     bilateral mastectomy    Stroke Maternal Grandmother     Stroke Maternal Grandfather     Colon cancer Neg Hx     Diabetes Neg Hx     Hypertension Neg Hx     Ovarian cancer Neg Hx     Congenital heart disease Neg Hx        Social History:    Social History     Social History    Marital status:      Spouse name: N/A    Number of children: N/A    Years of education: N/A     Occupational History    Not on file.     Social History Main Topics    Smoking status: Never Smoker    Smokeless tobacco: Never Used    Alcohol use Yes      Comment: alcohol use socially    Drug use: No    Sexual activity: Yes     Partners: Male     Birth control/ protection: OCP     Other Topics Concern    Not on file     Social History Narrative    She has a PhD from [employer].  She is  and lives in [place].  Her  is an internist at [employer].  She also works at [employer].     Medication:    Current Outpatient Prescriptions on File Prior to Visit   Medication Sig Dispense Refill    PNV 11-iron fum-folic acid-om3 (VIVA DHA) 28-1-200 mg Cap Take 1 capsule by mouth once daily. 30 each 12    valacyclovir (VALTREX) 500 MG tablet Take 2 tablets (1,000 mg total) by mouth 2 (two) times daily. 10 tablet 0     No current facility-administered medications on file prior to visit.          Past anesthesia history:    Hx of general anesthesia problems: Yes  past general anesthesia with complications (PONV)    Diagnostic Studies    I have reviewed the following. Relevant findings as noted:    Blood group: O POS   CBC:   Lab Results   Component Value Date    WBC 8.54 02/24/2017    RBC 4.03 02/24/2017    HGB 12.5 02/24/2017    HCT 36.1 (L) 02/24/2017     02/24/2017     BMP:   Lab Results   Component Value Date    GLU 85  09/28/2016     09/28/2016    K 4.2 09/28/2016     09/28/2016    CO2 22 (L) 09/28/2016    BUN 10 09/28/2016    CREATININE 0.7 09/28/2016    CALCIUM 8.8 09/28/2016    PROT 7.2 06/12/2013    ALBUMIN 4.0 06/12/2013     Coagulation: No results found for: INR, APTT    Spirometry:   Chest X ray:   EKG:    Cardiac studies: see HPI  · Stress test:  · TTE results:  · RHC results:  · LHC results:    CT scan:     Review of Systems     Constitution: no weight loss, fever, night sweats and feels well  Eyes:  no recent visual changes  ENT:  None  Respiratory:  None  Cardiovascular:  As per HPI.       METS: 3 - Average walk / vacuuming / very light cycling   Hematology: None   Gastrointestinal:  None    Genitourinary:  None  Musculoskeletal:  None  Neurologic:  None  Psych:No depression, No anxiety and No psychosis  Endocrine:  None                  Physical Examination:     · Vital signs:  LMP 08/10/2016 (Exact Date)       General appearance: healthy, alert, no distress, cooperativewell developed, well nourished female  Eye: conjunctivae/corneas clear. PERRL, EOM's intact. Fundi benign.  ENT: ENT exam normal, no neck nodes or sinus tenderness  Pulm: lungs clear to auscultation, breath sounds equal and symmetric  Cardiac: The first heart sound is normal. The second heart sound is fixed and split. There is a grade 3/6 systolic ejection murmur heard best at the upper left sternal border with radiation to the lung fields bilaterally.   Abdomen: soft, non-tender, without masses or organomegaly  Neuro: normal without focal findings, mental status, speech normal, alert and oriented x3, SHARON and reflexes normal and symmetric  Musculoskeletal: no joint tenderness, deformity or swelling  Skin: negative for - jaundice, spider hemangioma, telangiectasia, palmar erythema, ecchymosis and atrophy  Lymphatic: No abnormally enlarged lymph nodes.  Psych: oriented to time, place and person  Airway: negative II (hard and soft palate,  upper portion of tonsils anduvula visible)      Problem Assessment    ASA 2 - Patient with mild systemic disease with no functional limitations    History of present disease is positive for:  IUP at 35 weeks EGA with history of surgically corrected currently asymptomatic congenital heart disease              Plans & Recommendations    Patient comes to us with the above mentioned history. She has been recently seen [facility] and has been cleared for preferably a vaginal delivery. He discussed with patient concerns for arrhythmias and heart failure symptoms as per his note on 3/16/17. As far as labor pain, patient is open about options for this. I went over pros/cons and information about nitrous oxide, remifentanil patient controlled analgesia and epidural analgesia. She seems to be interested in maybe trying nitrous or going straight to an epidural depending on her pain tolerance. We also discussed anesthesia options in case she needed a  delivery (SSS, epidural, GETA if emergent). All questions were answered and patient seemed satisfied with our encounter. We will access her on admission to L&D for any new cardiac symptoms and otherwise we will proceed as planned. Patient will need telemetry monitoring during and immediately after delivery.       Entertained and answered all question to the patient's and family's satisfaction.   Additional Diagnostic Testing not needed            Svetlana Paz MD

## 2017-04-11 NOTE — PROGRESS NOTES
Here for routine OB appt at 34w6d, with no complaints.  Reports good FM.  Denies LOF, denies VB, denies contractions. Saw MFM on 4/3/17 for a f/u.  Reviewed warning signs of Labor and Preeclampsia.  Daily FM counts reinforced.  F/U scheduled 1 week; 3T labs and GBS at next visit; Tdap and OB anesthesia consult today.

## 2017-04-11 NOTE — MR AVS SNAPSHOT
[facility] [address]  [address]  [address]  [facility] [address]  Phone: [phone]  Fax: [phone]                  Samantha Lopez   2017 9:20 AM   Routine Prenatal    Description:  Female : 1985   Provider:  Carrie Scott NP   Department:  [facility] [address]           Reason for Visit     Routine Prenatal Visit                To Do List           Future Appointments        Provider Department Dept Phone    2017 9:45 AM GYN, INJECTION [facility] [address] [phone]    2017 10:30 AM OB ANESTHESIA, CONSULT [facility] [phone]      Goals (5 Years of Data)     None      [facility]     [facility] Nurse Care Line -  Assistance  Unless otherwise directed by your provider, please contact [facility], our nurse care line that is available for  assistance.     Registered nurses in the [facility] Center provide: appointment scheduling, clinical advisement, health education, and other advisory services.  Call: [phone] (toll free)               Medications           Message regarding Medications     Verify the changes and/or additions to your medication regime listed below are the same as discussed with your clinician today.  If any of these changes or additions are incorrect, please notify your healthcare provider.             Verify that the below list of medications is an accurate representation of the medications you are currently taking.  If none reported, the list may be blank. If incorrect, please contact your healthcare provider. Carry this list with you in case of emergency.           Current Medications     PNV 11-iron fum-folic acid-om3 (VIVA DHA) 28-1-200 mg Cap Take 1 capsule by mouth once daily.    valacyclovir (VALTREX) 500 MG tablet Take 2 tablets (1,000 mg total) by mouth 2 (two) times daily.           Clinical Reference Information           Prenatal Vitals     Enc. Date GA Prenatal Vitals  "Prenatal Pulse Pain Level Urine Albumin/Glucose Edema Presentation Dilation/Effacement/Station    4/11/17 34w6d 102/76 / 74.6 kg (164 lb 7.4 oz)   0 Negative / Negative       4/3/17 33w5d 102/72 / 74.1 kg (163 lb 5.8 oz)           3/29/17 33w0d 108/68 / 73 kg (160 lb 15 oz) 32 cm / + / Present  7 Negative / Negative None / None      2/6/17 25w5d 112/78 / 71 kg (156 lb 8.4 oz)           1/11/17 22w0d 110/70 / 69.2 kg (152 lb 8.9 oz)  / + / Present  0 Negative / Negative None / None / None      12/27/16 19w6d 112/74 / 66.3 kg (146 lb 2.6 oz)           12/14/16 18w0d 102/74 / 65.3 kg (143 lb 15.4 oz)  / 158 / Absent    None / None      11/16/16 14w0d 102/62 / 63 kg (139 lb)  / + / Absent  0 Negative / Negative None / None / None         TWG: 15.2 kg (33 lb 7.4 oz)   Pregravid weight: 59.4 kg (131 lb)   Number of fetuses: 1   Height: 5' 8" (1.727 m)   BMI: 19.9       Your Vitals Were     BP Weight Last Period BMI       102/76 74.6 kg (164 lb 7.4 oz) 08/10/2016 (Exact Date) 24.64 kg/m2       Allergies as of 4/11/2017     No Known Allergies      Immunizations Administered on Date of Encounter - 4/11/2017     None      Language Assistance Services     ATTENTION: Language assistance services are available, free of charge. Please call [phone].      ATENCIÓN: Si habla español, tiene a echevarria disposición servicios gratuitos de asistencia lingüística. Llame al [phone].     CHÚ Ý: N?u b?n nói Ti?ng Vi?t, có các d?ch v? h? tr? ngôn ng? mi?n phí dành cho b?n. G?i s? [phone].         [facility] [address] complies with applicable Federal civil rights laws and does not discriminate on the basis of race, color, national origin, age, disability, or sex.        "

## 2017-04-20 ENCOUNTER — ROUTINE PRENATAL (OUTPATIENT)
Dept: OBSTETRICS AND GYNECOLOGY | Facility: CLINIC | Age: 32
End: 2017-04-20
Payer: COMMERCIAL

## 2017-04-20 ENCOUNTER — LAB VISIT (OUTPATIENT)
Dept: LAB | Facility: HOSPITAL | Age: 32
End: 2017-04-20
Attending: OBSTETRICS & GYNECOLOGY
Payer: COMMERCIAL

## 2017-04-20 VITALS
SYSTOLIC BLOOD PRESSURE: 120 MMHG | BODY MASS INDEX: 24.91 KG/M2 | DIASTOLIC BLOOD PRESSURE: 70 MMHG | WEIGHT: 166.25 LBS

## 2017-04-20 DIAGNOSIS — Q24.9 ADULT CONGENITAL HEART DISEASE: ICD-10-CM

## 2017-04-20 DIAGNOSIS — Z32.01 POSITIVE PREGNANCY TEST: Primary | ICD-10-CM

## 2017-04-20 DIAGNOSIS — Z32.01 POSITIVE PREGNANCY TEST: ICD-10-CM

## 2017-04-20 DIAGNOSIS — O09.93 SUPERVISION OF HIGH RISK PREGNANCY, ANTEPARTUM, THIRD TRIMESTER: ICD-10-CM

## 2017-04-20 PROCEDURE — 86592 SYPHILIS TEST NON-TREP QUAL: CPT

## 2017-04-20 PROCEDURE — 36415 COLL VENOUS BLD VENIPUNCTURE: CPT

## 2017-04-20 PROCEDURE — 99999 PR PBB SHADOW E&M-EST. PATIENT-LVL III: CPT | Mod: PBBFAC,,, | Performed by: OBSTETRICS & GYNECOLOGY

## 2017-04-20 PROCEDURE — 0502F SUBSEQUENT PRENATAL CARE: CPT | Mod: S$GLB,,, | Performed by: OBSTETRICS & GYNECOLOGY

## 2017-04-20 PROCEDURE — 86703 HIV-1/HIV-2 1 RESULT ANTBDY: CPT

## 2017-04-20 NOTE — MR AVS SNAPSHOT
[place] [facility]  [address] [place] [address]  Phone: [phone]                  Samantha Lopez   2017 8:00 AM   Routine Prenatal    Description:  Female : 1985   Provider:  Rasheeda Macias MD   Department:  [place]           Reason for Visit     Routine Prenatal Visit           Diagnoses this Visit        Comments    Positive pregnancy test    -  Primary     Supervision of high risk pregnancy, antepartum, third trimester         Adult congenital heart disease                To Do List           Goals (5 Years of Data)     None      Follow-Up and Disposition     Return in about 1 week (around 2017).      [facility]     [facility] On Call Nurse Care Line -  Assistance  Unless otherwise directed by your provider, please contact [facility], our nurse care line that is available for  assistance.     Registered nurses in the [facility] provide: appointment scheduling, clinical advisement, health education, and other advisory services.  Call: [phone] (toll free)               Medications           Message regarding Medications     Verify the changes and/or additions to your medication regime listed below are the same as discussed with your clinician today.  If any of these changes or additions are incorrect, please notify your healthcare provider.             Verify that the below list of medications is an accurate representation of the medications you are currently taking.  If none reported, the list may be blank. If incorrect, please contact your healthcare provider. Carry this list with you in case of emergency.           Current Medications     PNV 11-iron fum-folic acid-om3 (VIVA DHA) 28-1-200 mg Cap Take 1 capsule by mouth once daily.    valacyclovir (VALTREX) 500 MG tablet Take 2 tablets (1,000 mg total) by mouth 2 (two) times daily.           Clinical Reference Information           Prenatal Vitals     Enc. Date GA  "Prenatal Vitals Prenatal Pulse Pain Level Urine Albumin/Glucose Edema Presentation Dilation/Effacement/Station    17 36w1d 120/70 / 75.4 kg (166 lb 3.6 oz)  / 135 / Present  0 Negative / Negative None / None / None      17 34w6d 102/76 / 74.6 kg (164 lb 7.4 oz) 34 cm / +++ / Absent  0 Negative / Negative None / None / None      4/3/17 33w5d 102/72 / 74.1 kg (163 lb 5.8 oz)           3/29/17 33w0d 108/68 / 73 kg (160 lb 15 oz) 32 cm / + / Present  7 Negative / Negative None / None      17 25w5d 112/78 / 71 kg (156 lb 8.4 oz)           17 22w0d 110/70 / 69.2 kg (152 lb 8.9 oz)  / + / Present  0 Negative / Negative None / None / None      16 19w6d 112/74 / 66.3 kg (146 lb 2.6 oz)           16 18w0d 102/74 / 65.3 kg (143 lb 15.4 oz)  / 158 / Absent    None / None      16 14w0d 102/62 / 63 kg (139 lb)  / + / Absent  0 Negative / Negative None / None / None         TW kg (35 lb 3.6 oz)   Pregravid weight: 59.4 kg (131 lb)   Number of fetuses: 1   Height: 5' 8" (1.727 m)   BMI: 19.9       Your Vitals Were     BP Weight Last Period BMI       120/70 75.4 kg (166 lb 3.6 oz) 08/10/2016 (Exact Date) 24.91 kg/m2       Allergies as of 2017     No Known Allergies      Immunizations Administered on Date of Encounter - 2017     None      Orders Placed During Today's Visit      Normal Orders This Visit    Strep B Screen, Vaginal / Rectal     Future Labs/Procedures Expected by Expires    HIV-1 and HIV-2 antibodies  2017    RPR  2017      Language Assistance Services     ATTENTION: Language assistance services are available, free of charge. Please call [phone].      ATENCIÓN: Si habla español, tiene a echevarria disposición servicios gratuitos de asistencia lingüística. Llame al [phone].     CHÚ Ý: N?u b?n nói Ti?ng Vi?t, có các d?ch v? h? tr? ngôn ng? mi?n phí dành cho b?n. G?i s? [phone].         [facility] complies with " applicable Federal civil rights laws and does not discriminate on the basis of race, color, national origin, age, disability, or sex.

## 2017-04-20 NOTE — PROGRESS NOTES
Complaints today: none. Patient has been to see OB anesthesia for pre-L&D consultation. Patient lives in , discussed questions about what signs/symtpoms she should drive to [facility].    /70  Wt 75.4 kg (166 lb 3.6 oz)  LMP 08/10/2016 (Exact Date)  BMI 24.91 kg/m2    31 y.o., at 36w1d by Estimated Date of Delivery: 17  Patient Active Problem List   Diagnosis    Nevus    Supervision of high risk pregnancy, antepartum - flu shot/tdap/breast/undecided.    Double outlet right ventricle    Pulmonary valve replaced    Pulmonary stenosis    Adult congenital heart disease    Personal history of surgery to heart and great vessels, presenting hazards to health     OB History    Para Term  AB SAB TAB Ectopic Multiple Living   1 0 0 0 0 0 0 0 0 0      # Outcome Date GA Lbr Stevo/2nd Weight Sex Delivery Anes PTL Lv   1 Current                 Dating reviewed  Allergies and problem list reviewed and updated  Medical and surgical history reviewed  Prenatal labs reviewed and updated    Physical Exam:  ABD: soft, gravid, nontender, size appropriate for dates.  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    Plan:   T3 labs  GBS swab collected  No issues  Patient has been assessed by OB anesthesia  RTC 1 week  Kick counts, labor precautions were discussed  Discussed MICHAEL at [facility]

## 2017-04-21 LAB
HIV 1+2 AB+HIV1 P24 AG SERPL QL IA: NEGATIVE
RPR SER QL: NORMAL

## 2017-04-24 LAB — BACTERIA SPEC AEROBE CULT: NORMAL

## 2017-04-27 ENCOUNTER — HOSPITAL ENCOUNTER (OUTPATIENT)
Dept: PERINATAL CARE | Facility: OTHER | Age: 32
Discharge: HOME | End: 2017-04-27
Attending: OBSTETRICS & GYNECOLOGY
Payer: COMMERCIAL

## 2017-04-27 ENCOUNTER — ROUTINE PRENATAL (OUTPATIENT)
Dept: OBSTETRICS AND GYNECOLOGY | Facility: CLINIC | Age: 32
End: 2017-04-27
Payer: COMMERCIAL

## 2017-04-27 VITALS
DIASTOLIC BLOOD PRESSURE: 70 MMHG | BODY MASS INDEX: 24.84 KG/M2 | SYSTOLIC BLOOD PRESSURE: 102 MMHG | WEIGHT: 165.81 LBS

## 2017-04-27 DIAGNOSIS — O36.8390 FETAL HEART RATE DECELERATIONS AFFECTING MANAGEMENT OF MOTHER: ICD-10-CM

## 2017-04-27 DIAGNOSIS — D22.9 NEVUS: ICD-10-CM

## 2017-04-27 DIAGNOSIS — O09.93 SUPERVISION OF HIGH RISK PREGNANCY, ANTEPARTUM, THIRD TRIMESTER: Primary | ICD-10-CM

## 2017-04-27 PROCEDURE — 59025 FETAL NON-STRESS TEST: CPT | Mod: 26,,, | Performed by: OBSTETRICS & GYNECOLOGY

## 2017-04-27 PROCEDURE — 59025 FETAL NON-STRESS TEST: CPT

## 2017-04-27 PROCEDURE — 0502F SUBSEQUENT PRENATAL CARE: CPT | Mod: S$GLB,,, | Performed by: OBSTETRICS & GYNECOLOGY

## 2017-04-27 PROCEDURE — 99999 PR PBB SHADOW E&M-EST. PATIENT-LVL II: CPT | Mod: PBBFAC,,, | Performed by: OBSTETRICS & GYNECOLOGY

## 2017-04-27 NOTE — MR AVS SNAPSHOT
[place] - [facility]/ [facility]  [address] [address]  Phone: [phone]                  Samantha Lopez   2017 3:00 PM   Routine Prenatal    Description:  Female : 1985   Provider:  Rasheeda Macias MD   Department:  [place] [facility]           Reason for Visit     Routine Prenatal Visit           Diagnoses this Visit        Comments    Nevus    -  Primary            To Do List           Future Appointments        Provider Department Dept Phone    2017 3:20 PM ULTRASOUND, [facility] FLR ON CALL [facility] [place] - Maternal Fetal Med [phone]      Goals (5 Years of Data)     None      Follow-Up and Disposition     Return in about 1 week (around 2017).      [facility]     [facility] On Call Nurse Care Line -  Assistance  Unless otherwise directed by your provider, please contact [facility] On-Call, our nurse care line that is available for  assistance.     Registered nurses in the [facility] On Call Center provide: appointment scheduling, clinical advisement, health education, and other advisory services.  Call: [phone] (toll free)               Medications           Message regarding Medications     Verify the changes and/or additions to your medication regime listed below are the same as discussed with your clinician today.  If any of these changes or additions are incorrect, please notify your healthcare provider.             Verify that the below list of medications is an accurate representation of the medications you are currently taking.  If none reported, the list may be blank. If incorrect, please contact your healthcare provider. Carry this list with you in case of emergency.           Current Medications     PNV 11-iron fum-folic acid-om3 (VIVA DHA) 28-1-200 mg Cap Take 1 capsule by mouth once daily.    valacyclovir (VALTREX) 500 MG tablet Take 2 tablets (1,000 mg total) by mouth 2 (two) times daily.           Clinical Reference Information           Prenatal  "Vitals     Enc. Date GA Prenatal Vitals Prenatal Pulse Pain Level Urine Albumin/Glucose Edema Presentation Dilation/Effacement/Station    4/27/17 37w1d 102/70 / 75.2 kg (165 lb 12.6 oz)   0 Negative / Negative       4/20/17 36w1d 120/70 / 75.4 kg (166 lb 3.6 oz) 36 cm / 135 / Present  0 Negative / Negative None / None / None      4/11/17 34w6d 102/76 / 74.6 kg (164 lb 7.4 oz) 34 cm / +++ / Absent  0 Negative / Negative None / None / None      4/3/17 33w5d 102/72 / 74.1 kg (163 lb 5.8 oz)           3/29/17 33w0d 108/68 / 73 kg (160 lb 15 oz) 32 cm / + / Present  7 Negative / Negative None / None      2/6/17 25w5d 112/78 / 71 kg (156 lb 8.4 oz)           1/11/17 22w0d 110/70 / 69.2 kg (152 lb 8.9 oz)  / + / Present  0 Negative / Negative None / None / None      12/27/16 19w6d 112/74 / 66.3 kg (146 lb 2.6 oz)           12/14/16 18w0d 102/74 / 65.3 kg (143 lb 15.4 oz)  / 158 / Absent    None / None      11/16/16 14w0d 102/62 / 63 kg (139 lb)  / + / Absent  0 Negative / Negative None / None / None         TWG: 15.8 kg (34 lb 12.6 oz)   Pregravid weight: 59.4 kg (131 lb)   Number of fetuses: 1   Height: 5' 8" (1.727 m)   BMI: 19.9       Your Vitals Were     BP Weight Last Period BMI       102/70 75.2 kg (165 lb 12.6 oz) 08/10/2016 (Exact Date) 24.84 kg/m2       Allergies as of 4/27/2017     No Known Allergies      Immunizations Administered on Date of Encounter - 4/27/2017     None      Language Assistance Services     ATTENTION: Language assistance services are available, free of charge. Please call [phone].      ATENCIÓN: Si petty gray, tiene a echevarria disposición servicios gratuitos de asistencia lingüística. Tho arellano [phone].     GEETA Ý: N?u b?n nói Ti?ng Vi?t, có các d?ch v? h? tr? ngôn ng? mi?n phí dành cho b?n. G?i s? [phone].         [facility] complies with applicable Federal civil rights laws and does not discriminate on the basis of race, color, national origin, age, disability, or " sex.

## 2017-04-28 ENCOUNTER — PATIENT MESSAGE (OUTPATIENT)
Dept: OBSTETRICS AND GYNECOLOGY | Facility: CLINIC | Age: 32
End: 2017-04-28

## 2017-05-01 ENCOUNTER — OFFICE VISIT (OUTPATIENT)
Dept: MATERNAL FETAL MEDICINE | Facility: CLINIC | Age: 32
End: 2017-05-01
Attending: OBSTETRICS & GYNECOLOGY
Payer: COMMERCIAL

## 2017-05-01 DIAGNOSIS — Q24.9 ADULT CONGENITAL HEART DISEASE: ICD-10-CM

## 2017-05-01 PROCEDURE — 76816 OB US FOLLOW-UP PER FETUS: CPT | Mod: S$GLB,,, | Performed by: OBSTETRICS & GYNECOLOGY

## 2017-05-01 PROCEDURE — 76819 FETAL BIOPHYS PROFIL W/O NST: CPT | Mod: S$GLB,,, | Performed by: OBSTETRICS & GYNECOLOGY

## 2017-05-01 PROCEDURE — 1160F RVW MEDS BY RX/DR IN RCRD: CPT | Mod: S$GLB,,, | Performed by: OBSTETRICS & GYNECOLOGY

## 2017-05-01 PROCEDURE — 99212 OFFICE O/P EST SF 10 MIN: CPT | Mod: 25,S$GLB,, | Performed by: OBSTETRICS & GYNECOLOGY

## 2017-05-01 NOTE — LETTER
May 1, 2017      Rasheeda Macias MD  [address]  [address]  [facility] [address]           [facility]  [address]  [facility] [address]  Phone: [phone]          Patient: Samantha Lopez   MR Number: [id number]   YOB: 1985   Date of Visit: 5/1/2017       Dear Dr. Rasheeda Macias:    Thank you for referring Samantha Lopez to me for evaluation. Attached you will find relevant portions of my assessment and plan of care.    If you have questions, please do not hesitate to call me. I look forward to following Samantha Lopez along with you.    Sincerely,    Jose Penn III, MD    Enclosure  CC:  No Recipients    If you would like to receive this communication electronically, please contact [email or web address] or [phone] to request more information on [facility] [facility] [employer] access.    For providers and/or their staff who would like to refer a patient to [facility], please contact us through our one-stop-shop provider referral line, [facility] , at [phone].    If you feel you have received this communication in error or would no longer like to receive these types of communications, please e-mail [email or web address]

## 2017-05-01 NOTE — PROGRESS NOTES
31 G1 INEZ 5/17/17; 37w5d    Prenatal record reviewed  My last note reviewed  Pt interviewed and examined  Ultrasound done  Maternal serum screen wnl declined  Interval pregnancy problems - none  No leaking, bleeding or abnormal discharge  Pt is on LDA    EPIC reviewed    Tetralogy of Falot  Seen by Dr. 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 Until last year, she was followed at [facility] by Dr. Olivares.  EKG, echo, CPX and MRI have all been reassuring  Last visit with Dr. Levine on 3/16/17 - he feels she can labor without worry about decompensation  Fetal echo with Dr. 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 Now working at [employer]  Her  is an Internist at [employer].    Impression  =========  Fetal growth and surveillance is reassuring.  The AF is normal.      Recommendation  ==============  Would plan induction during the 39th week (Pt lives in )  Peds Cards suggests SBE prophylaxis  Careful monitoring of I&O during admission.

## 2017-05-02 ENCOUNTER — TELEPHONE (OUTPATIENT)
Dept: OBSTETRICS AND GYNECOLOGY | Facility: CLINIC | Age: 32
End: 2017-05-02

## 2017-05-02 NOTE — TELEPHONE ENCOUNTER
----- Message from Alex Maloney sent at 5/2/2017 10:02 AM CDT -----  Pt returning your call [phone]

## 2017-05-02 NOTE — TELEPHONE ENCOUNTER
----- Message from Ja Pena sent at 5/2/2017  8:45 AM CDT -----  Ob pt needs appt for this week with dr teran. Pt can be reached at [phone].

## 2017-05-03 ENCOUNTER — ROUTINE PRENATAL (OUTPATIENT)
Dept: OBSTETRICS AND GYNECOLOGY | Facility: CLINIC | Age: 32
End: 2017-05-03
Attending: OBSTETRICS & GYNECOLOGY
Payer: COMMERCIAL

## 2017-05-03 VITALS — DIASTOLIC BLOOD PRESSURE: 70 MMHG | WEIGHT: 169.56 LBS | SYSTOLIC BLOOD PRESSURE: 118 MMHG | BODY MASS INDEX: 25.4 KG/M2

## 2017-05-03 DIAGNOSIS — O09.93 SUPERVISION OF HIGH RISK PREGNANCY, ANTEPARTUM, THIRD TRIMESTER: Primary | ICD-10-CM

## 2017-05-03 PROCEDURE — 0502F SUBSEQUENT PRENATAL CARE: CPT | Mod: S$GLB,,, | Performed by: OBSTETRICS & GYNECOLOGY

## 2017-05-03 PROCEDURE — 99999 PR PBB SHADOW E&M-EST. PATIENT-LVL II: CPT | Mod: PBBFAC,,, | Performed by: OBSTETRICS & GYNECOLOGY

## 2017-05-03 NOTE — MR AVS SNAPSHOT
[facility] - OB/GYN [address]  [address]  [address]  [facility] [address]  Phone: [phone]  Fax: [phone]                  Samantha Lopez   5/3/2017 2:00 PM   Routine Prenatal    Description:  Female : 1985   Provider:  Rasheeda Macias MD   Department:  [facility] - [facility]N [address]           Reason for Visit     Routine Prenatal Visit           Diagnoses this Visit        Comments    Supervision of high risk pregnancy, antepartum, third trimester    -  Primary            To Do List           Goals (5 Years of Data)     None      Follow-Up and Disposition     Return in about 1 week (around 5/10/2017).      [facility] On Call     [facility] On Call Nurse Care Line -  Assistance  Unless otherwise directed by your provider, please contact [facility], our nurse care line that is available for  assistance.     Registered nurses in the [facility] On Call Center provide: appointment scheduling, clinical advisement, health education, and other advisory services.  Call: [phone] (toll free)               Medications           Message regarding Medications     Verify the changes and/or additions to your medication regime listed below are the same as discussed with your clinician today.  If any of these changes or additions are incorrect, please notify your healthcare provider.             Verify that the below list of medications is an accurate representation of the medications you are currently taking.  If none reported, the list may be blank. If incorrect, please contact your healthcare provider. Carry this list with you in case of emergency.           Current Medications     PNV 11-iron fum-folic acid-om3 (VIVA DHA) 28-1-200 mg Cap Take 1 capsule by mouth once daily.    valacyclovir (VALTREX) 500 MG tablet Take 2 tablets (1,000 mg total) by mouth 2 (two) times daily.           Clinical Reference Information           Prenatal Vitals     Enc. Date GA Prenatal Vitals Prenatal Pulse Pain Level  "Urine Albumin/Glucose Edema Presentation Dilation/Effacement/Station    5/3/17 38w0d 118/70 / 76.9 kg (169 lb 8.5 oz)   0 Negative / Negative None / None / None      17 37w1d 102/70 / 75.2 kg (165 lb 12.6 oz) 37 cm / + / Present  0 Negative / Negative None / None  0 / 0 / -3    17 36w1d 120/70 / 75.4 kg (166 lb 3.6 oz) 36 cm / 135 / Present  0 Negative / Negative None / None / None      17 34w6d 102/76 / 74.6 kg (164 lb 7.4 oz) 34 cm / +++ / Absent  0 Negative / Negative None / None / None      4/3/17 33w5d 102/72 / 74.1 kg (163 lb 5.8 oz)           3/29/17 33w0d 108/68 / 73 kg (160 lb 15 oz) 32 cm / + / Present  7 Negative / Negative None / None      17 25w5d 112/78 / 71 kg (156 lb 8.4 oz)           17 22w0d 110/70 / 69.2 kg (152 lb 8.9 oz)  / + / Present  0 Negative / Negative None / None / None      16 19w6d 112/74 / 66.3 kg (146 lb 2.6 oz)           16 18w0d 102/74 / 65.3 kg (143 lb 15.4 oz)  / 158 / Absent    None / None      16 14w0d 102/62 / 63 kg (139 lb)  / + / Absent  0 Negative / Negative None / None / None         TW.5 kg (38 lb 8.5 oz)   Pregravid weight: 59.4 kg (131 lb)   Number of fetuses: 1   Height: 5' 8" (1.727 m)   BMI: 19.9       Your Vitals Were     BP Weight Last Period BMI       118/70 76.9 kg (169 lb 8.5 oz) 08/10/2016 (Exact Date) 25.4 kg/m2       Allergies as of 5/3/2017     No Known Allergies      Immunizations Administered on Date of Encounter - 5/3/2017     None      Language Assistance Services     ATTENTION: Language assistance services are available, free of charge. Please call [phone].      ATENCIÓN: Si habla español, tiene a echevarria disposición servicios gratuitos de asistencia lingüística. Llame al [phone].     CHÚ Ý: N?u b?n nói Ti?ng Vi?t, có các d?ch v? h? tr? ngôn ng? mi?n phí dành cho b?n. G?i s? [phone].         [facility] [address] complies with applicable Federal civil rights laws and does not " discriminate on the basis of race, color, national origin, age, disability, or sex.

## 2017-05-04 ENCOUNTER — PATIENT MESSAGE (OUTPATIENT)
Dept: OBSTETRICS AND GYNECOLOGY | Facility: CLINIC | Age: 32
End: 2017-05-04

## 2017-05-09 ENCOUNTER — PATIENT MESSAGE (OUTPATIENT)
Dept: OBSTETRICS AND GYNECOLOGY | Facility: CLINIC | Age: 32
End: 2017-05-09

## 2017-05-14 ENCOUNTER — ANESTHESIA EVENT (OUTPATIENT)
Dept: OBSTETRICS AND GYNECOLOGY | Facility: OTHER | Age: 32
End: 2017-05-14
Payer: COMMERCIAL

## 2017-05-14 ENCOUNTER — ANESTHESIA (OUTPATIENT)
Dept: OBSTETRICS AND GYNECOLOGY | Facility: OTHER | Age: 32
End: 2017-05-14
Payer: COMMERCIAL

## 2017-05-14 ENCOUNTER — HOSPITAL ENCOUNTER (INPATIENT)
Facility: OTHER | Age: 32
LOS: 3 days | Discharge: HOME | End: 2017-05-17
Attending: OBSTETRICS & GYNECOLOGY | Admitting: OBSTETRICS & GYNECOLOGY
Payer: COMMERCIAL

## 2017-05-14 DIAGNOSIS — R94.31: ICD-10-CM

## 2017-05-14 LAB
ABO + RH BLD: NORMAL
BASOPHILS # BLD AUTO: 0 K/UL
BASOPHILS NFR BLD: 0 %
BLD GP AB SCN CELLS X3 SERPL QL: NORMAL
DIFFERENTIAL METHOD: ABNORMAL
EOSINOPHIL # BLD AUTO: 0 K/UL
EOSINOPHIL NFR BLD: 0.3 %
ERYTHROCYTE [DISTWIDTH] IN BLOOD BY AUTOMATED COUNT: 12.9 %
HCT VFR BLD AUTO: 37.8 %
HGB BLD-MCNC: 12.7 G/DL
LYMPHOCYTES # BLD AUTO: 1 K/UL
LYMPHOCYTES NFR BLD: 10.6 %
MCH RBC QN AUTO: 30.2 PG
MCHC RBC AUTO-ENTMCNC: 33.6 %
MCV RBC AUTO: 90 FL
MONOCYTES # BLD AUTO: 0.5 K/UL
MONOCYTES NFR BLD: 5.1 %
NEUTROPHILS # BLD AUTO: 7.6 K/UL
NEUTROPHILS NFR BLD: 83.3 %
PLATELET # BLD AUTO: 192 K/UL
PMV BLD AUTO: 10.9 FL
RBC # BLD AUTO: 4.21 M/UL
WBC # BLD AUTO: 9.09 K/UL

## 2017-05-14 PROCEDURE — 72100002 HC LABOR CARE, 1ST 8 HOURS

## 2017-05-14 PROCEDURE — 27800517 HC TRAY,EPIDURAL-CONTINUOUS: Performed by: ANESTHESIOLOGY

## 2017-05-14 PROCEDURE — 25000003 PHARM REV CODE 250: Performed by: ANESTHESIOLOGY

## 2017-05-14 PROCEDURE — 25000003 PHARM REV CODE 250: Performed by: STUDENT IN AN ORGANIZED HEALTH CARE EDUCATION/TRAINING PROGRAM

## 2017-05-14 PROCEDURE — 85025 COMPLETE CBC W/AUTO DIFF WBC: CPT

## 2017-05-14 PROCEDURE — 62326 NJX INTERLAMINAR LMBR/SAC: CPT | Performed by: ANESTHESIOLOGY

## 2017-05-14 PROCEDURE — 63600175 PHARM REV CODE 636 W HCPCS: Performed by: ANESTHESIOLOGY

## 2017-05-14 PROCEDURE — 86900 BLOOD TYPING SEROLOGIC ABO: CPT

## 2017-05-14 PROCEDURE — 86850 RBC ANTIBODY SCREEN: CPT

## 2017-05-14 PROCEDURE — 59409 OBSTETRICAL CARE: CPT | Mod: QK,,, | Performed by: ANESTHESIOLOGY

## 2017-05-14 PROCEDURE — 27200710 HC EPIDURAL INFUSION PUMP SET: Performed by: ANESTHESIOLOGY

## 2017-05-14 PROCEDURE — 11000001 HC ACUTE MED/SURG PRIVATE ROOM

## 2017-05-14 RX ORDER — OXYTOCIN/RINGER'S LACTATE 20/1000 ML
2 PLASTIC BAG, INJECTION (ML) INTRAVENOUS CONTINUOUS
Status: DISCONTINUED | OUTPATIENT
Start: 2017-05-14 | End: 2017-05-15

## 2017-05-14 RX ORDER — SODIUM CHLORIDE, SODIUM LACTATE, POTASSIUM CHLORIDE, CALCIUM CHLORIDE 600; 310; 30; 20 MG/100ML; MG/100ML; MG/100ML; MG/100ML
INJECTION, SOLUTION INTRAVENOUS CONTINUOUS
Status: DISCONTINUED | OUTPATIENT
Start: 2017-05-14 | End: 2017-05-16

## 2017-05-14 RX ORDER — LIDOCAINE HYDROCHLORIDE AND EPINEPHRINE 15; 5 MG/ML; UG/ML
INJECTION, SOLUTION EPIDURAL
Status: DISCONTINUED | OUTPATIENT
Start: 2017-05-14 | End: 2017-05-15

## 2017-05-14 RX ORDER — MISOPROSTOL 200 UG/1
800 TABLET ORAL
Status: DISCONTINUED | OUTPATIENT
Start: 2017-05-14 | End: 2017-05-16

## 2017-05-14 RX ORDER — TERBUTALINE SULFATE 1 MG/ML
0.25 INJECTION SUBCUTANEOUS
Status: DISCONTINUED | OUTPATIENT
Start: 2017-05-14 | End: 2017-05-15

## 2017-05-14 RX ORDER — FENTANYL CITRATE 50 UG/ML
INJECTION, SOLUTION INTRAMUSCULAR; INTRAVENOUS
Status: DISCONTINUED | OUTPATIENT
Start: 2017-05-14 | End: 2017-05-15

## 2017-05-14 RX ORDER — FENTANYL/BUPIVACAINE/NS/PF 2MCG/ML-.1
PLASTIC BAG, INJECTION (ML) INJECTION CONTINUOUS PRN
Status: DISCONTINUED | OUTPATIENT
Start: 2017-05-14 | End: 2017-05-15

## 2017-05-14 RX ORDER — OXYTOCIN/RINGER'S LACTATE 20/1000 ML
41.65 PLASTIC BAG, INJECTION (ML) INTRAVENOUS CONTINUOUS
Status: ACTIVE | OUTPATIENT
Start: 2017-05-14 | End: 2017-05-14

## 2017-05-14 RX ORDER — OXYTOCIN/RINGER'S LACTATE 20/1000 ML
10 PLASTIC BAG, INJECTION (ML) INTRAVENOUS ONCE
Status: DISCONTINUED | OUTPATIENT
Start: 2017-05-14 | End: 2017-05-16

## 2017-05-14 RX ORDER — BUPIVACAINE HYDROCHLORIDE 2.5 MG/ML
INJECTION, SOLUTION EPIDURAL; INFILTRATION; INTRACAUDAL
Status: DISPENSED
Start: 2017-05-14 | End: 2017-05-15

## 2017-05-14 RX ORDER — METHYLERGONOVINE MALEATE 0.2 MG/ML
200 INJECTION INTRAVENOUS
Status: DISCONTINUED | OUTPATIENT
Start: 2017-05-14 | End: 2017-05-16

## 2017-05-14 RX ORDER — ONDANSETRON 8 MG/1
8 TABLET, ORALLY DISINTEGRATING ORAL EVERY 8 HOURS PRN
Status: DISCONTINUED | OUTPATIENT
Start: 2017-05-14 | End: 2017-05-15

## 2017-05-14 RX ORDER — FENTANYL CITRATE 50 UG/ML
INJECTION, SOLUTION INTRAMUSCULAR; INTRAVENOUS
Status: DISPENSED
Start: 2017-05-14 | End: 2017-05-15

## 2017-05-14 RX ORDER — SODIUM CHLORIDE, SODIUM LACTATE, POTASSIUM CHLORIDE, CALCIUM CHLORIDE 600; 310; 30; 20 MG/100ML; MG/100ML; MG/100ML; MG/100ML
INJECTION, SOLUTION INTRAVENOUS CONTINUOUS
Status: DISCONTINUED | OUTPATIENT
Start: 2017-05-14 | End: 2017-05-15

## 2017-05-14 RX ORDER — FENTANYL/BUPIVACAINE/NS/PF 2MCG/ML-.1
PLASTIC BAG, INJECTION (ML) INJECTION
Status: DISPENSED
Start: 2017-05-14 | End: 2017-05-15

## 2017-05-14 RX ORDER — CARBOPROST TROMETHAMINE 250 UG/ML
250 INJECTION, SOLUTION INTRAMUSCULAR
Status: DISCONTINUED | OUTPATIENT
Start: 2017-05-14 | End: 2017-05-16

## 2017-05-14 RX ADMIN — Medication 10 ML/HR: at 11:05

## 2017-05-14 RX ADMIN — Medication 10 ML: at 11:05

## 2017-05-14 RX ADMIN — SODIUM CHLORIDE, SODIUM LACTATE, POTASSIUM CHLORIDE, AND CALCIUM CHLORIDE 1000 ML: .6; .31; .03; .02 INJECTION, SOLUTION INTRAVENOUS at 03:05

## 2017-05-14 RX ADMIN — FENTANYL CITRATE 100 MCG: 50 INJECTION, SOLUTION INTRAMUSCULAR; INTRAVENOUS at 11:05

## 2017-05-14 RX ADMIN — MISOPROSTOL 50 MCG: 100 TABLET ORAL at 04:05

## 2017-05-14 RX ADMIN — SODIUM CHLORIDE, SODIUM LACTATE, POTASSIUM CHLORIDE, AND CALCIUM CHLORIDE: .6; .31; .03; .02 INJECTION, SOLUTION INTRAVENOUS at 03:05

## 2017-05-14 RX ADMIN — Medication 2 MILLI-UNITS/MIN: at 10:05

## 2017-05-14 RX ADMIN — LIDOCAINE HYDROCHLORIDE AND EPINEPHRINE 3 ML: 15; 5 INJECTION, SOLUTION EPIDURAL at 11:05

## 2017-05-14 NOTE — IP AVS SNAPSHOT
[facility] [place]  [address] [place], [place] [address]  Phone: [phone]           Patient Discharge Instructions   Our goal is to set you up for success. This packet includes information on your condition, medications, and your home care.  It will help you care for yourself to prevent having to return to the hospital.     Please ask your nurse if you have any questions.      There are many details to remember when preparing to leave the hospital. Here is what you will need to do:    1. Take your medicine. If you are prescribed medications, review your Medication List on the following pages. You may have new medications to  at the pharmacy and others that you'll need to stop taking. Review the instructions for how and when to take your medications. Talk with your doctor or nurses if you are unsure of what to do.     2. Go to your follow-up appointments. Specific follow-up information is listed in the following pages. Your may be contacted by a nurse or clinical provider about future appointments. Be sure we have all of the phone numbers to reach you. Please contact your provider's office if you are unable to make an appointment.     3. Watch for warning signs. Your doctor or nurse will give you detailed warning signs to watch for and when to call for assistance. These instructions may also include educational information about your condition. If you experience any of warning signs to your health, call your doctor.           [facility]  Unless otherwise directed by your provider, please   contact [facility], our nurse care line   that is available for 24/7 assistance.     [phone] (toll-free)     Registered nurses in the [facility] Center   provide: appointment scheduling, clinical advisement, health education, and other advisory services.                  ** Verify the list of medication(s) below is accurate and up to date. Carry this with you in case of  emergency. If your medications have changed, please notify your healthcare provider.             Medication List      START taking these medications        Additional Info                      ibuprofen 600 MG tablet   Commonly known as:  ADVIL,MOTRIN   Quantity:  30 tablet   Refills:  3   Dose:  600 mg    Last time this was given:  600 mg on 5/17/2017  5:29 AM   Instructions:  Take 1 tablet (600 mg total) by mouth every 6 (six) hours as needed.     Begin Date    AM    Noon    PM    Bedtime         CONTINUE taking these medications        Additional Info                      PNV 11-iron fum-folic acid-om3 28-1-200 mg Cap   Commonly known as:  VIVA DHA   Quantity:  30 each   Refills:  12   Dose:  1 capsule    Instructions:  Take 1 capsule by mouth once daily.     Begin Date    AM    Noon    PM    Bedtime         STOP taking these medications     valacyclovir 500 MG tablet   Commonly known as:  VALTREX            Where to Get Your Medications      These medications were sent to [facility] [id number] [address] AT [facility] [address], [place] [address]     Phone:  [phone]     ibuprofen 600 MG tablet                  Please bring to all follow up appointments:    1. A copy of your discharge instructions.  2. All medicines you are currently taking in their original bottles.  3. Identification and insurance card.    Please arrive 15 minutes ahead of scheduled appointment time.    Please call 24 hours in advance if you must reschedule your appointment and/or time.        Your Scheduled Appointments     Jun 28, 2017 12:30 PM CDT   EKG with EKG, APPT   José Luis Cornelius - EKG ([facility] )    [address]  [facility] [address] [phone]            Jun 28, 2017  1:00 PM CDT   Established Patient Visit with MD José Luis Jackson- Cardiology ([facility] )    [address]  [facility] [address] [phone]               Follow-up Information     Follow up with Rasheeda Macias MD. Schedule an appointment as soon as possible for a visit in 6 weeks.    Specialties:  Obstetrics, Obstetrics and Gynecology    Contact information:    [address] [address]  [phone]          Discharge Instructions     Future Orders    Activity as tolerated     Call MD for:  difficulty breathing or increased cough     Call MD for:  increased confusion or weakness     Call MD for:  persistent dizziness, light-headedness, or visual disturbances     Call MD for:  persistent nausea and vomiting or diarrhea     Call MD for:  redness, tenderness, or signs of infection (pain, swelling, redness, odor or green/yellow discharge around incision site)     Call MD for:  severe persistent headache     Call MD for:  severe uncontrolled pain     Call MD for:  temperature >100.4     Diet general     Questions:    Total calories:      Fat restriction, if any:      Protein restriction, if any:      Na restriction, if any:      Fluid restriction:      Additional restrictions:          Discharge Instructions       Breastfeeding Discharge Instructions       Feed the baby at the earliest sign of hunger or comfort  o Hands to mouth, sucking motions  o Rooting or searching for something to suck on  o Dont wait for crying - it is a sign of distress     The feedings may be 8-12 times per 24hrs and will not follow a schedule   Avoid pacifiers and bottles for the first 4 weeks   Alternate the breast you start the feeding with, or start with the breast that feels the fullest   Switch breasts when the baby takes himself off the breast or falls asleep   Keep offering breasts until the baby looks full, no longer gives hunger signs, and stays asleep when placed on his back in the crib   If the baby is sleepy and wont wake for a feeding, put the baby skin-to-skin dressed in a diaper against the mothers bare chest   Sleep near your baby   The baby  should be positioned and latched on to the breast correctly  o Chest-to-chest, chin in the breast  o Babys lips are flipped outward  o Babys mouth is stretched open wide like a shout  o Babys sucking should feel like tugging to the mother  - The baby should be drinking at the breast:  o You should hear swallowing or gulping throughout the feeding  o You should see milk on the babys lips when he comes off the breast  o Your breasts should be softer when the baby is finished feeding  o The baby should look relaxed at the end of feedings  o After the 4th day and your milk is in:  o The babys poop should turn bright yellow and be loose, watery, and seedy  o The baby should have at least 3-4 poops the size of the palm of your hand per day  o The baby should have at least 5-6 wet diapers per day  o The urine should be light yellow in color  You should drink when you are thirsty and eat a healthy diet when you are    hungry.     Take naps to get the rest you need.   Take medications and/or drink alcohol only with permission of your obstetrician    or the babys pediatrician.  You can also call the Infant Risk Center,   ([phone]), Monday-Friday, 8am-5pm Central time, to get the most   up-to-date evidence-based information on the use of medications during   pregnancy and breastfeeding.      The baby should be examined by a pediatrician at 3-5 days of age.  Once your   milk comes in, the baby should be gaining at least ½ - 1oz each day and should be back to birthweight no later than 10-14 days of age.          Community Resources    [facility] Breastfeeding Warmline: [phone]   Local [facility] clinics: provide incentives and breastpumps to eligible mothers  La Leche Leelver International (LLLI):  mother-to-mother support group website        www.lll.org  Local La Leche League mother-to-mother support groups:        www.lllVericept.[employer]        La Leche League of [place]   Dr. Neel Sanchez website  "for latch videos and general information:        www.breastfeedinginc.ca  Infant Risk Center is a call center that provides information about the safety of taking medications while breastfeeding.  Call [phone], M-F, 8am-5pm, CT.  International Lactation Consultant Association provides resources for assistance:        www.ilca.org  [place] Breastfeeding Coalition provides informationand resources for parents  and the community    http://[facility].org     Thais Sandoval is a mom-to-mom support group:                             www.nolanesting.com//breastfeedng-support/  Partners for Healthy Babies:  1-715-786-BABY(6309)  Cafe au Lait: a breastfeeding support group for women of color, 686.756.7986          Primary Diagnosis     Your primary diagnosis was:  Normal Vaginal Delivery      Admission Information     Date & Time Provider Department CSN    5/14/2017  3:38 PM Rasheeda Macias MD [facility] [id number]      Care Providers     Provider Role Specialty Primary office phone    Rasheeda Macias MD Attending Provider Obstetrics [phone]    Sailaja Carmona MD Consulting Physician  Obstetrics and Gynecology [phone]      Your Vitals Were     BP Pulse Temp Resp Height Weight    97/62 (Patient Position: Lying, BP Method: Automatic) 87 98.3 °F (36.8 °C) (Oral) 18 5' 8" (1.727 m) 74 kg (163 lb 2.3 oz)    Last Period SpO2 BMI          08/10/2016 (Exact Date) 96% 24.81 kg/m2        Recent Lab Values     No lab values to display.      Allergies as of 5/17/2017     No Known Allergies      Advance Directives     An advance directive is a document which, in the event you are no longer able to make decisions for yourself, tells your healthcare team what kind of treatment you do or do not want to receive, or who you would like to make those decisions for you.  If you do not currently have an advance directive, [facility] encourages you to create one.  For more information call:  [phone] " [phone]), [phone] ([phone]),  or log on to [email or web address].        Language Assistance Services     ATTENTION: Language assistance services are available, free of charge. Please call [phone].      ATENCIÓN: Si habla español, tiene a echevarria disposición servicios gratuitos de asistencia lingüística. Llame al [phone].     [facility] Ý: N?u b?n nói Ti?ng Vi?t, có các d?ch v? h? tr? ngôn ng? mi?n phí dành cho b?n. G?i s? [phone].         [facility] complies with applicable Federal civil rights laws and does not discriminate on the basis of race, color, national origin, age, disability, or sex.

## 2017-05-14 NOTE — H&P
HISTORY AND PHYSICAL                                                OBSTETRICS          Subjective:       Samantha Lopez is a 31 y.o.  female with IUP at 39w4d weeks gestation who presents for induction labor.    This IUP is complicated by maternal cardiac defects including double outlet right ventricle and pulmonary stenosis s/p pulmonary valve replacement.  Patient denies regular painful contractions, vaginal bleeding, LOF.   Fetal Movement: normal.     PMHx:   Past Medical History:   Diagnosis Date    Double outlet right ventricle     Migraine headache        PSHx:   Past Surgical History:   Procedure Laterality Date    ROSENDO PROCEDURE      [facility] Dr. Deleon at 2 months of age    NASAL SEPTUM SURGERY      RASTELLI PROCEDURE      in [facility] by Dr. Deleon at 3 years of age    RV to PA conduit  2007    at [place] by Dr. Deleon - 22mm porcine valved conduit       All: Review of patient's allergies indicates:  No Known Allergies    Meds:   Prescriptions Prior to Admission   Medication Sig Dispense Refill Last Dose    PNV 11-iron fum-folic acid-om3 (VIVA DHA) 28-1-200 mg Cap Take 1 capsule by mouth once daily. 30 each 12 Taking    valacyclovir (VALTREX) 500 MG tablet Take 2 tablets (1,000 mg total) by mouth 2 (two) times daily. 10 tablet 0        SH:   Social History     Social History    Marital status:      Spouse name: N/A    Number of children: N/A    Years of education: N/A     Occupational History    Not on file.     Social History Main Topics    Smoking status: Never Smoker    Smokeless tobacco: Never Used    Alcohol use Yes      Comment: alcohol use socially    Drug use: No    Sexual activity: Yes     Partners: Male     Birth control/ protection: OCP     Other Topics Concern    Not on file     Social History Narrative    She has a PhD from [employer].  She is  and lives in [place].  Her  is an internist at [employer].  She also works at [employer].       FH:   Family History   Problem Relation Age of Onset    Breast cancer Maternal Aunt 40     bilateral mastectomy    Stroke Maternal Grandmother     Stroke Maternal Grandfather     Colon cancer Neg Hx     Diabetes Neg Hx     Hypertension Neg Hx     Ovarian cancer Neg Hx     Congenital heart disease Neg Hx        OBHx:   Obstetric History       T0      TAB0   SAB0   E0   M0   L0       # Outcome Date GA Lbr Stevo/2nd Weight Sex Delivery Anes PTL Lv   1 Current                   Objective:       BP (!) 104/56  Pulse 77  Temp 97.5 °F (36.4 °C) (Temporal)   Resp 18  Ht 5' 8" (1.727 m)  Wt 74 kg (163 lb 2.3 oz)  LMP 08/10/2016 (Exact Date)  SpO2 96%  Breastfeeding? No  BMI 24.81 kg/m2    Vitals:    17 1656 17 1700 17 1726 17 1735   BP: 116/69  (!) 104/56    Pulse: 84 87  77   Resp:       Temp:       TempSrc:       SpO2:  95%  96%   Weight:       Height:           General:   alert, appears stated age, cooperative and no distress   Lungs:   clear to auscultation bilaterally   Heart:   regular rate and rhythm, S1, S2 normal, no murmur, click, rub or gallop   Abdomen:  soft, non-tender; bowel sounds normal; no masses,  no organomegaly   Extremities negative edema, negative erythema   FHT: 140 Cat 1 (reassuring)                 TOCO: irregular   Presentations: cephalic by ultrasound   Cervix:     Dilation: Closed    Effacement: 50%    Station:  -3    Consistency: medium    Position: posterior   Sterile Speculum Exam: deferred    Lab Review  Blood Type O POS  GBBS: negative  Rubella: Immune  RPR: NR  HIV: negative  HepB: negative       Assessment:       39w4d weeks gestation, labor induction    Active Hospital Problems    Diagnosis  POA    *Indication for care in labor or delivery [O75.9]  Unknown    Adult congenital heart disease [Q24.9]  Not Applicable      Resolved Hospital Problems    Diagnosis Date Resolved " POA   No resolved problems to display.          Plan:      Risks, benefits, alternatives and possible complications have been discussed in detail with the patient.   - Consents signed and to chart  - Admit to Labor and Delivery unit  - Labor plan - cytotec PO followed by pitocin and AROM when appropriate   - Epidural per Anesthesia  - Draw CBC, T&S  - Notify Staff  - Recheck in 4 hrs or PRN    Post-Partum Hemorrhage risk - low          Em Marmolejo MD  OBGYN - PGY 2  Pager: [phone]

## 2017-05-14 NOTE — ANESTHESIA PREPROCEDURE EVALUATION
"Samantha Lopez is a 31 y.o. female  with pmh of Tetralogy of Fallot s/p multiple surgeries, with pulm HTN (54) otherwise pt. Exercises regularly, Is in good health with normal functional capacity. Recently completed a CPX study with our cardiology department noting "Normal functional status associated with a normal breathing reserve, normal oxygen saturation, an adequate effort, and a normal AT. No clinically significant functional impairment."     ECHO 3/16/17:  History of DORV with malposed great vessels and PS S/P Stevie:  Technically challenging study now 31 weeks EGA with study demonstrating no significant change from previous-  Moderately dilated inferior vena cava and right atrium  Mild  tricuspid insufficiency at velocity suggesting right ventricular pressure >60 mmHg.   Mildly dilated right ventricle with qualitatively good right ventricular systolic  function.  No evidence of residual left to right shunt at intracardiac baffle  Valved conduit from RV to pulmonary arteries with peak velocity <2.7 m/sec., mean gradient <18 mmHg and at least mild insufficiency.  LPA with no obvious stenosis.  RPA difficult to demonstrate  Left atrium is compressed by posterior structures.  Left ventricle is normal in size  Paradoxical septal motion with good movement of the left ventricular free wall,  SF= 37% and EF estimated 55-60% from apical four chamber views  The E/e'(lat) is 6, consistent with normal diastolic function.   No evidence of stenosis across VSD with laminar flow throughout intracardiac conduit to aortic valve.   Mild aortic insufficiency arising at central jet.   Large ascending aorta arising anteriorly.    OB History    Para Term  AB SAB TAB Ectopic Multiple Living   1 0 0 0 0 0 0 0 0 0      # Outcome Date GA Lbr Stevo/2nd Weight Sex Delivery Anes PTL Lv   1 Current                   Wt Readings from Last 1 Encounters:   17 1445 76.9 kg (169 lb 8.5 oz)       BP Readings from Last 3 " Encounters:   05/03/17 118/70   04/27/17 102/70   04/20/17 120/70       Patient Active Problem List   Diagnosis    Nevus    Supervision of high risk pregnancy, antepartum - flu shot/tdap/breast/undecided.    Double outlet right ventricle    Pulmonary valve replaced    Pulmonary stenosis    Adult congenital heart disease    Personal history of surgery to heart and great vessels, presenting hazards to health       Past Surgical History:   Procedure Laterality Date    ROSENDO PROCEDURE      BT shunt in [facility] by Dr. Deleon at 2 months of age    NASAL SEPTUM SURGERY      RASTELLI PROCEDURE      in [facility] by Dr. Deleon at 3 years of age    RV to PA conduit  06/07/2007    at [place] by Dr. Deleon - 22mm porcine valved conduit       Social History     Social History    Marital status:      Spouse name: N/A    Number of children: N/A    Years of education: N/A     Occupational History    Not on file.     Social History Main Topics    Smoking status: Never Smoker    Smokeless tobacco: Never Used    Alcohol use Yes      Comment: alcohol use socially    Drug use: No    Sexual activity: Yes     Partners: Male     Birth control/ protection: OCP     Other Topics Concern    Not on file     Social History Narrative    She has a PhD from [employer].  She is  and lives in [place].  Her  is an internist at [employer].  She also works at [employer].         Chemistry        Component Value Date/Time     09/28/2016 1006    K 4.2 09/28/2016 1006     09/28/2016 1006    CO2 22 (L) 09/28/2016 1006    BUN 10 09/28/2016 1006    CREATININE 0.7 09/28/2016 1006    GLU 85 09/28/2016 1006        Component Value Date/Time    CALCIUM 8.8 09/28/2016 1006    ALKPHOS 57 06/12/2013 0930    AST 23 06/12/2013 0930    ALT 13 06/12/2013 0930    BILITOT 0.7 06/12/2013 0930            Lab Results   Component Value Date    WBC 8.54 02/24/2017    HGB 12.5  02/24/2017    HCT 36.1 (L) 02/24/2017    MCV 90 02/24/2017     02/24/2017       No results for input(s): INR, PROTIME, APTT in the last 72 hours.    Invalid input(s): PT                    Anesthesia Evaluation    I have reviewed the Patient Summary Reports.    I have reviewed the Nursing Notes.   I have reviewed the Medications.     Review of Systems  Anesthesia Hx:  No problems with previous Anesthesia  History of prior surgery of interest to airway management or planning: Denies Family Hx of Anesthesia complications.   Denies Personal Hx of Anesthesia complications.   Social:  Non-Smoker    Hematology/Oncology:  Hematology Normal   Oncology Normal     EENT/Dental:EENT/Dental Normal   Cardiovascular:   Exercise tolerance: good Valvular problems/Murmurs ECG has been reviewed. pmh of Tetralogy of Fallot s/p multiple surgeries, with pulm HTN (54)   Pulmonary:  Pulmonary Normal    Renal/:  Renal/ Normal     Hepatic/GI:  Hepatic/GI Normal        Physical Exam  General:  Well nourished    Airway/Jaw/Neck:  Airway Findings: Mouth Opening: Normal Tongue: Normal  General Airway Assessment: Adult  Mallampati: II  Improves to II with phonation.      Dental:  Dental Findings: In tact   Chest/Lungs:  Chest/Lungs Findings: Clear to auscultation, Normal Respiratory Rate     Heart/Vascular:  Heart Findings: Rate: Normal  Rhythm: Regular Rhythm  Sounds: Normal  Heart murmur: negative    Abdomen:  Abdomen Findings: Normal      Mental Status:  Mental Status Findings:  Cooperative, Alert and Oriented         Anesthesia Plan  Type of Anesthesia, risks & benefits discussed:  Anesthesia Type:  general, epidural, CSE, spinal  Patient's Preference:   Intra-op Monitoring Plan: standard ASA monitors  Intra-op Monitoring Plan Comments:   Post Op Pain Control Plan:   Post Op Pain Control Plan Comments:   Induction:   IV  Beta Blocker:  Patient is not currently on a Beta-Blocker (No further documentation required).       Informed  Consent: Patient understands risks and agrees with Anesthesia plan.  Questions answered. Anesthesia consent signed with patient.  ASA Score: 3     Day of Surgery Review of History & Physical: I have interviewed and examined the patient. I have reviewed the patient's H&P dated:        Anesthesia Plan Notes: Patient is status post congenital heart repair. She is doing very well. She exercises and is active without symptoms. Plan is for a vaginal delivery with a labor epidural.   MONICA Espinoza.        Ready For Surgery From Anesthesia Perspective.

## 2017-05-15 LAB
ALBUMIN SERPL BCP-MCNC: 2.5 G/DL
ALLENS TEST: ABNORMAL
ALP SERPL-CCNC: 78 U/L
ALT SERPL W/O P-5'-P-CCNC: 10 U/L
ANION GAP SERPL CALC-SCNC: 17 MMOL/L
AST SERPL-CCNC: 18 U/L
BILIRUB SERPL-MCNC: 0.6 MG/DL
BUN SERPL-MCNC: 6 MG/DL
CALCIUM SERPL-MCNC: 8.5 MG/DL
CHLORIDE SERPL-SCNC: 109 MMOL/L
CO2 SERPL-SCNC: 11 MMOL/L
CREAT SERPL-MCNC: 0.6 MG/DL
EST. GFR  (AFRICAN AMERICAN): >60 ML/MIN/1.73 M^2
EST. GFR  (NON AFRICAN AMERICAN): >60 ML/MIN/1.73 M^2
GLUCOSE SERPL-MCNC: 83 MG/DL
HCO3 UR-SCNC: 21.8 MMOL/L (ref 24–28)
MAGNESIUM SERPL-MCNC: 1.3 MG/DL
PCO2 BLDA: 54.7 MMHG (ref 35–45)
PH SMN: 7.21 [PH] (ref 7.35–7.45)
PHOSPHATE SERPL-MCNC: 3.4 MG/DL
PO2 BLDA: 19 MMHG (ref 80–100)
POC BE: -6 MMOL/L
POC SATURATED O2: 21 % (ref 95–100)
POTASSIUM SERPL-SCNC: 4 MMOL/L
PROT SERPL-MCNC: 5.7 G/DL
SAMPLE: ABNORMAL
SITE: ABNORMAL
SODIUM SERPL-SCNC: 137 MMOL/L

## 2017-05-15 PROCEDURE — 83735 ASSAY OF MAGNESIUM: CPT

## 2017-05-15 PROCEDURE — 0UQMXZZ REPAIR VULVA, EXTERNAL APPROACH: ICD-10-PCS | Performed by: OBSTETRICS & GYNECOLOGY

## 2017-05-15 PROCEDURE — 11000001 HC ACUTE MED/SURG PRIVATE ROOM

## 2017-05-15 PROCEDURE — 99900035 HC TECH TIME PER 15 MIN (STAT)

## 2017-05-15 PROCEDURE — 93005 ELECTROCARDIOGRAM TRACING: CPT

## 2017-05-15 PROCEDURE — 51702 INSERT TEMP BLADDER CATH: CPT

## 2017-05-15 PROCEDURE — 59400 OBSTETRICAL CARE: CPT | Mod: ,,, | Performed by: OBSTETRICS & GYNECOLOGY

## 2017-05-15 PROCEDURE — 72100002 HC LABOR CARE, 1ST 8 HOURS

## 2017-05-15 PROCEDURE — 25000003 PHARM REV CODE 250: Performed by: STUDENT IN AN ORGANIZED HEALTH CARE EDUCATION/TRAINING PROGRAM

## 2017-05-15 PROCEDURE — 93010 ELECTROCARDIOGRAM REPORT: CPT | Mod: ,,, | Performed by: INTERNAL MEDICINE

## 2017-05-15 PROCEDURE — 10907ZC DRAINAGE OF AMNIOTIC FLUID, THERAPEUTIC FROM PRODUCTS OF CONCEPTION, VIA NATURAL OR ARTIFICIAL OPENING: ICD-10-PCS | Performed by: OBSTETRICS & GYNECOLOGY

## 2017-05-15 PROCEDURE — 59200 INSERT CERVICAL DILATOR: CPT

## 2017-05-15 PROCEDURE — 72200005 HC VAGINAL DELIVERY LEVEL II

## 2017-05-15 PROCEDURE — 82803 BLOOD GASES ANY COMBINATION: CPT

## 2017-05-15 PROCEDURE — 0HQ9XZZ REPAIR PERINEUM SKIN, EXTERNAL APPROACH: ICD-10-PCS | Performed by: OBSTETRICS & GYNECOLOGY

## 2017-05-15 PROCEDURE — 80053 COMPREHEN METABOLIC PANEL: CPT

## 2017-05-15 PROCEDURE — 84100 ASSAY OF PHOSPHORUS: CPT

## 2017-05-15 RX ORDER — DIPHENHYDRAMINE HYDROCHLORIDE 50 MG/ML
25 INJECTION INTRAMUSCULAR; INTRAVENOUS EVERY 4 HOURS PRN
Status: DISCONTINUED | OUTPATIENT
Start: 2017-05-15 | End: 2017-05-16

## 2017-05-15 RX ORDER — OXYCODONE AND ACETAMINOPHEN 10; 325 MG/1; MG/1
1 TABLET ORAL EVERY 4 HOURS PRN
Status: DISCONTINUED | OUTPATIENT
Start: 2017-05-15 | End: 2017-05-17 | Stop reason: HOSPADM

## 2017-05-15 RX ORDER — DIPHENHYDRAMINE HYDROCHLORIDE 50 MG/ML
12.5 INJECTION INTRAMUSCULAR; INTRAVENOUS EVERY 4 HOURS PRN
Status: DISCONTINUED | OUTPATIENT
Start: 2017-05-15 | End: 2017-05-15

## 2017-05-15 RX ORDER — OXYTOCIN 10 [USP'U]/ML
INJECTION, SOLUTION INTRAMUSCULAR; INTRAVENOUS
Status: DISCONTINUED
Start: 2017-05-15 | End: 2017-05-15 | Stop reason: WASHOUT

## 2017-05-15 RX ORDER — FAMOTIDINE 10 MG/ML
20 INJECTION INTRAVENOUS ONCE
Status: DISCONTINUED | OUTPATIENT
Start: 2017-05-15 | End: 2017-05-15

## 2017-05-15 RX ORDER — SODIUM CITRATE AND CITRIC ACID MONOHYDRATE 334; 500 MG/5ML; MG/5ML
30 SOLUTION ORAL ONCE
Status: DISCONTINUED | OUTPATIENT
Start: 2017-05-15 | End: 2017-05-15

## 2017-05-15 RX ORDER — FENTANYL/BUPIVACAINE/NS/PF 2MCG/ML-.1
PLASTIC BAG, INJECTION (ML) INJECTION CONTINUOUS
Status: DISCONTINUED | OUTPATIENT
Start: 2017-05-15 | End: 2017-05-15

## 2017-05-15 RX ORDER — OXYTOCIN/RINGER'S LACTATE 20/1000 ML
41.65 PLASTIC BAG, INJECTION (ML) INTRAVENOUS CONTINUOUS
Status: ACTIVE | OUTPATIENT
Start: 2017-05-15 | End: 2017-05-15

## 2017-05-15 RX ORDER — ONDANSETRON 8 MG/1
8 TABLET, ORALLY DISINTEGRATING ORAL EVERY 8 HOURS PRN
Status: DISCONTINUED | OUTPATIENT
Start: 2017-05-15 | End: 2017-05-17 | Stop reason: HOSPADM

## 2017-05-15 RX ORDER — ACETAMINOPHEN 325 MG/1
650 TABLET ORAL EVERY 6 HOURS PRN
Status: DISCONTINUED | OUTPATIENT
Start: 2017-05-15 | End: 2017-05-16

## 2017-05-15 RX ORDER — ONDANSETRON 2 MG/ML
4 INJECTION INTRAMUSCULAR; INTRAVENOUS ONCE
Status: DISCONTINUED | OUTPATIENT
Start: 2017-05-15 | End: 2017-05-16

## 2017-05-15 RX ORDER — ONDANSETRON 2 MG/ML
4 INJECTION INTRAMUSCULAR; INTRAVENOUS ONCE
Status: DISCONTINUED | OUTPATIENT
Start: 2017-05-15 | End: 2017-05-15

## 2017-05-15 RX ORDER — ZOLPIDEM TARTRATE 5 MG/1
5 TABLET ORAL NIGHTLY PRN
Status: DISCONTINUED | OUTPATIENT
Start: 2017-05-15 | End: 2017-05-17 | Stop reason: HOSPADM

## 2017-05-15 RX ORDER — DOCUSATE SODIUM 100 MG/1
200 CAPSULE, LIQUID FILLED ORAL 2 TIMES DAILY PRN
Status: DISCONTINUED | OUTPATIENT
Start: 2017-05-15 | End: 2017-05-17 | Stop reason: HOSPADM

## 2017-05-15 RX ORDER — DIPHENHYDRAMINE HCL 25 MG
25 CAPSULE ORAL EVERY 4 HOURS PRN
Status: DISCONTINUED | OUTPATIENT
Start: 2017-05-15 | End: 2017-05-17 | Stop reason: HOSPADM

## 2017-05-15 RX ORDER — OXYCODONE AND ACETAMINOPHEN 5; 325 MG/1; MG/1
1 TABLET ORAL EVERY 4 HOURS PRN
Status: DISCONTINUED | OUTPATIENT
Start: 2017-05-15 | End: 2017-05-17 | Stop reason: HOSPADM

## 2017-05-15 RX ORDER — IBUPROFEN 600 MG/1
600 TABLET ORAL EVERY 6 HOURS
Status: DISCONTINUED | OUTPATIENT
Start: 2017-05-15 | End: 2017-05-17 | Stop reason: HOSPADM

## 2017-05-15 RX ADMIN — ONDANSETRON 8 MG: 8 TABLET, ORALLY DISINTEGRATING ORAL at 12:05

## 2017-05-15 RX ADMIN — SODIUM CHLORIDE, SODIUM LACTATE, POTASSIUM CHLORIDE, AND CALCIUM CHLORIDE: .6; .31; .03; .02 INJECTION, SOLUTION INTRAVENOUS at 12:05

## 2017-05-15 NOTE — PROGRESS NOTES
MD to bedside due to recurrent variable decelerations.  Upon entry to room, patient is on her left side, O2 mask is in place. Pitocin is off.     Cook balloon pulled, cervix 6-7/80/-2  AROM performed with clear fluid in return  Patient turned to maternal right  500 cc bolus started    FHTs now baseline 145 bpm, Moderate BTBV, - acceleration, repetitive early decelerations  [place]: q 3 minutes    FHTs overall reassuring at this point  Will continue close maternal fetal monitoring   Will be cautious to not fluid overload patient due to maternal heart malformation    Camilla Pacheco MD  Ob/Gyn PGY-1

## 2017-05-15 NOTE — PROGRESS NOTES
"LABOR NOTE    S:  Complaints: No.  Epidural working:  not applicable    O: /70  Pulse 84  Temp 97.5 °F (36.4 °C) (Temporal)   Resp 18  Ht 5' 8" (1.727 m)  Wt 74 kg (163 lb 2.3 oz)  LMP 08/10/2016 (Exact Date)  SpO2 96%  Breastfeeding? No  BMI 24.81 kg/m2      FHT: 140 Cat 2 (reassuring), moderate BTBV, +accelerations, occasional late decelerations  CTX: q 2 minutes  SVE: 1.5/50/-1      ASSESSMENT:   31 y.o.  at 39w4d, admitted for induction of labor    FHT reassuring    Active Hospital Problems    Diagnosis  POA    *Indication for care in labor or delivery [O75.9]  Unknown    Adult congenital heart disease [Q24.9]  Not Applicable      Resolved Hospital Problems    Diagnosis Date Resolved POA   No resolved problems to display.         PLAN:    Continue Close Maternal/Fetal Monitoring  S/p PO cytotec at 1653, contraction too often for a second dose of cytotec at this time  Will start Pitocin Augmentation per protocol once reactive, reassuring NST is obtained  Will place a cook lo balloon if unchanged at next check  Recheck 2 hours or PRN      Karla Lawton MD  PGY 3 OB/GYN  [phone]    "

## 2017-05-15 NOTE — PROGRESS NOTES
STRIP NOTE    FHTs: 140 bpm, Mod BTBV, + accel, occasional variable decel, Overall reactive/reassuring  [place]: q2-4 min with coupling      S/p cytotec x1  Cook balloon in place  Continue increase in pitocin per protocol   Will recheck cervix at 5AM     Camilla Pacheco MD  Ob/Gyn PGY-1

## 2017-05-15 NOTE — ANESTHESIA PROCEDURE NOTES
Epidural    Patient location during procedure: OB   Reason for block: primary anesthetic   Diagnosis: IUP   Start time: 5/14/2017 11:36 AM  Timeout: 5/14/2017 11:35 AM  End time: 5/14/2017 11:40 AM  Surgery related to: Vaginal Delivery  Staffing  Anesthesiologist: ELIZABETH BOLES III  Resident/CRNA: SWETHA RANDHAWA  Performed by: resident/CRNA   Preanesthetic Checklist  Completed: patient identified, site marked, surgical consent, pre-op evaluation, timeout performed, IV checked, risks and benefits discussed, monitors and equipment checked, anesthesia consent given, hand hygiene performed and patient being monitored  Preparation  Patient position: sitting  Prep: ChloraPrep  Patient monitoring: Pulse Ox  Epidural  Skin Anesthetic: lidocaine 1%  Skin Wheal: 3 mL  Administration type: continuous  Approach: midline  Interspace: L3-4  Injection technique: SHAWANDA saline  Needle and Epidural Catheter  Needle type: Tuohy   Needle gauge: 17  Needle length: 3.5 inches  Needle insertion depth: 5 cm  Catheter type: springwound  Catheter size: 19 G  Catheter at skin depth: 9 cm  Test dose: 3 mL of lidocaine 1.5% with Epi 1-to-200,000  Additional Documentation: incremental injection, negative aspiration for heme and CSF, no paresthesia on injection, no signs/symptoms of IV or SA injection, no significant pain on injection and no significant complaints from patient  Needle localization: anatomical landmarks  Medications:  Bolus administered: 10 mL of 0.125% bupivacaine  Opioid administered: 100 mcg of   fentanyl  Volume per aspiration: 5 mL  Time between aspirations: 5 minutes  Assessment  Ease of block: easy  Patient's tolerance of the procedure: comfortable throughout block and no complaints  Post dural Puncture Headache?: No

## 2017-05-15 NOTE — PROGRESS NOTES
"LABOR NOTE    S:  MD to bedside for cervical check. Epidural in place, working    O: BP (!) 102/59  Pulse 73  Temp 97.5 °F (36.4 °C) (Temporal)   Resp 18  Ht 5' 8" (1.727 m)  Wt 74 kg (163 lb 2.3 oz)  LMP 08/10/2016 (Exact Date)  SpO2 97%  Breastfeeding? No  BMI 24.81 kg/m2      FHT: 140 Cat 2 (reassuring), moderate BTBV, +accelerations, occasional decelerations  CTX: q 2-4 minutes  SVE: 250/-1  Cook balloon placed without difficulty, 40 cc in each balloon      ASSESSMENT:   31 y.o.  at 39w4d, admitted for induction of labor    FHT reassuring    Active Hospital Problems    Diagnosis  POA    *Indication for care in labor or delivery [O75.9]  Unknown    Adult congenital heart disease [Q24.9]  Not Applicable      Resolved Hospital Problems    Diagnosis Date Resolved POA   No resolved problems to display.         PLAN:    Continue Close Maternal/Fetal Monitoring  S/p PO cytotec x1  Cook balloon in place  Will start pitocin now, increase per protocol  Recheck 2 hours or PRN      Camilla Pacheco MD  Ob/Gyn PGY-1        "

## 2017-05-15 NOTE — PROGRESS NOTES
Patient sitting on sofa.  at bedside. Patient instructed not to ambulate in room without RN. Call light at bedside. Patient instructed to call RN when ready to ambulate. Patient and spouse verbalize understanding. Will continue to monitor.

## 2017-05-15 NOTE — LACTATION NOTE
05/15/17 0825   Maternal Infant Assessment   Breast Shape round;Bilateral:   Breast Density soft;Bilateral:   Areola elastic;Bilateral:   Nipple(s) graspable;Left:   LATCH Score   Latch 2-->grasps breast, tongue down, lips flanged, rhythmic sucking   Audible Swallowing 1-->a few with stimulation   Type Of Nipple 2-->everted (after stimulation)   Comfort (Breast/Nipple) 2-->soft/nontender   Hold (Positioning) 0-->full assist (staff holds infant at breast)   Score (less than 7 for 2/more consecutive times, consult Lactation Consultant) 7       Number Scale   Presence of Pain denies   Location - Side Left   Location nipple(s)   Pain Rating: Activity 0   Maternal Infant Feeding   Maternal Emotional State assist needed   Infant Positioning cross-cradle   Signs of Milk Transfer audible swallow;breasts soften with feeding;infant jaw motion present   Time Spent (min) 15-30 min   Nipple Shape After Feeding, Left (round)   Latch Assistance yes   Breastfeeding Education adequate infant intake;importance of skin-to-skin contact   Feeding Infant   Feeding Readiness Cues finger sucking;rooting   Satiety Cues cessation of sucking;infant releases breast;infant's body extended;sleeping after feeding   Feeding Tolerance/Success alert for feeding;coordinated suck;coordinated swallow   Effective Latch During Feeding yes   Audible Swallow yes   Suck/Swallow Coordination present   Skin-to-Skin Contact During Feeding yes   Lactation Referrals   Lactation Consult Breastfeeding assessment;Initial assessment;Knowledge deficit   Lactation Interventions   Attachment Promotion breastfeeding assistance provided;counseling provided;environment adjusted;face-to-face positioning promoted;family involvement promoted;infant-mother separation minimized;privacy provided;role responsibility promoted;rooming-in promoted;skin-to-skin contact encouraged   Breastfeeding Assistance assisted with positioning;feeding cue recognition promoted;feeding session  observed;infant latch-on verified;infant suck/swallow verified;support offered   Maternal Breastfeeding Support diary/feeding log utilized;encouragement offered;infant-mother separation minimized;lactation counseling provided;maternal hydration promoted;maternal rest encouraged   Latch Promotion positioning assisted;infant moved to breast   Provided basic lactation and Mother's Breastfeeding Guide; with patient's permission assisted with breastfeeding; baby actively sucking with wide mouth pauses and  until content; patient's partner at bedside, providing assistance and support;

## 2017-05-15 NOTE — L&D DELIVERY NOTE
cephalic after approximately 10 minutes of maternal pushing.  Under epidural anesthesia.  Infant delivered OA over intact perineum.  Nuchal x1 reduced at introitus.  Male infant tolerated the delivery well and was passed to awaiting nursing staff for cleaning and evaluation  Cord clamped and cut.  Umbilical arterial gas and venous blood obtained.  Placenta delivered with gentle traction and IV pitocin given  Uterine tone noted. No cervical lacerations.  1st degree laceration, right periurethral laceration, and left labial laceration repaired with 2-0 and 3-0 vicryl in usual fashion, good hemostasis obtained  Patient tolerated delivery well.   cc  Staff present for entire procedure.  S/L/N counts correct x2.     Staff:  I have reviewed the notes, assessments, and/or procedures performed by Dr. Pacheco, I concur with her/his documentation of Samantha Lopez.  I was present for entire surgical procedure and provided direct supervision.         Delivery Information for  Bernardo Lopez    Birth information:  YOB: 2018   Time of birth: 5:22 AM   Sex: male   Head Delivery Date/Time: 5/15/2017  5:21 AM   Delivery type: Vaginal, Spontaneous Delivery   Gestational Age: 39w5d    Delivery Providers    Delivering clinician:  CLARI EARLY   Other personnel:   Provider Role   FRANCISCO COLON Resident   DYLAN PACHECO Resident   JAE CARBALLO Registered Nurse   DYLAN CARDONA Charge Nurse                  [place] Measurements    Weight:  3600 g Length:  52.1 cm   Head circum.:  34.9 cm Chest circum.:  32.4 cm           Assessment    Living status:  Yes   Apgars    1 Minute:   5 Minute:   10 Minute 15 Minute 20 Minute   Skin Color: 1  1       Heart Rate: 2  2       Reflex Irritability: 2  2       Muscle Tone: 0  2       Respiratory Effort: 2  2       Total: 7  9                  Apgars Assigned By:  QUINTON RASMUSSEN          Assisted Delivery Details:    Forceps attempted?:  No    Vacuum extractor attempted?:  No             Shoulder Dystocia    Shoulder dystocia present?:  No                                             Presentation and Position    Presentation: Vertex   Position:                    Interventions/Resuscitation    Method:  Bulb Suctioning, Tactile Stimulation        Cord    Vessels:  3 vessels   Complications:  Nuchal   Nuchal Intervention:  reduced   Nuchal Cord Description:  loose nuchal cord   Number of Loops:  1   Delayed Cord Clamping?:  No   Cord Clamped Date/Time:  5/15/2017  5:22 AM   Cord Blood Disposition:  Sent with Baby   Gases Sent?:  Yes        Placenta    Date and time:  5/15/2017  5:24 AM   Removal:  Spontaneous   Appearance:  Intact   Placenta disposition:  Discarded            Labor Events:       labor:       Labor Onset Date/Time:         Dilation Complete Date/Time: 05/15/2017 05:00     Start Pushing Date/Time: 05/15/2017 05:12     Rupture Date/Time:              Rupture type:           Fluid Amount:        Fluid Color:        Fluid Odor:        Membrane Status (PeriCalm): ARM (Artificial Rupture)      Rupture Date/Time (PeriCalm): 05/15/2017 04:24:00      Fluid Amount (PeriCalm): Moderate      Fluid Color (PeriCalm): Clear       steroids:       Antibiotics given for GBS:       Induction: balloon dilation (Camacho);misoprostol     Indications for induction:  Elective     Augmentation: oxytocin;amniotomy     Indications for augmentation:       Labor complications: None     Additional complications:          Cervical ripenin2017 4:53 PM      Misoprostol          Delivery:      Episiotomy: None     Indication for Episiotomy:       Perineal Lacerations: 1st Repaired:  Yes   Periurethral Laceration: right Repaired: Yes   Labial Laceration: left Repaired: Yes   Sulcus Laceration:   Repaired:     Vaginal Laceration: No Repaired:     Cervical Laceration: No Repaired:     Repair suture:       Repair # of packets: 4     Vaginal delivery QBL  (mL): 417      QBL (mL): 0     Combined Blood Loss (mL): 417     Vaginal Sweep Performed: Yes     Surgicount Correct: Yes       Other providers:       Anesthesia    Method:  Epidural              Details (if applicable):  Trial of Labor      Categorization:      Priority:     Indications for :     Incision Type:       Additional  information:  Forceps:    Vacuum:    Breech:    Observed anomalies    Other (Comments):

## 2017-05-15 NOTE — PROGRESS NOTES
"LABOR NOTE    S:  Complaints: Patient complains of increase contraction intensity.  Epidural working:  not applicable    O: BP (!) 102/59  Pulse 73  Temp 97.5 °F (36.4 °C) (Temporal)   Resp 18  Ht 5' 8" (1.727 m)  Wt 74 kg (163 lb 2.3 oz)  LMP 08/10/2016 (Exact Date)  SpO2 97%  Breastfeeding? No  BMI 24.81 kg/m2      FHT: 140 Cat 2 (reassuring), moderate BTBV, +accelerations, occasional late decelerations  CTX: q 2 minutes  SVE: 1.5/50/-1      ASSESSMENT:   31 y.o.  at 39w4d, admitted for induction of labor    FHT reassuring    Active Hospital Problems    Diagnosis  POA    *Indication for care in labor or delivery [O75.9]  Unknown    Adult congenital heart disease [Q24.9]  Not Applicable      Resolved Hospital Problems    Diagnosis Date Resolved POA   No resolved problems to display.         PLAN:    Continue Close Maternal/Fetal Monitoring  S/p PO cytotec at 1653  Pitocin Augmentation per protocol, 4mu/min  Will place a cook lo balloon once patient comfortable with epidural  Recheck 2 hours or PRN      Karla Lawton MD  PGY 3 OB/GYN  [phone]    "

## 2017-05-15 NOTE — PLAN OF CARE
Problem: Patient Care Overview  Goal: Plan of Care Review  Outcome: Ongoing (interventions implemented as appropriate)  Developed the following breastfeeding plan of care with patient: she will breastfeed baby on cue until content at least 8 times in 24 hours observing for signs of milk transfer; she will wake baby prn; she will avoid bottles, formula and pacifiers; she will call her RN for assistance with breastfeeding;

## 2017-05-15 NOTE — DISCHARGE INSTRUCTIONS
Breastfeeding Discharge Instructions       Feed the baby at the earliest sign of hunger or comfort  o Hands to mouth, sucking motions  o Rooting or searching for something to suck on  o Dont wait for crying - it is a sign of distress     The feedings may be 8-12 times per 24hrs and will not follow a schedule   Avoid pacifiers and bottles for the first 4 weeks   Alternate the breast you start the feeding with, or start with the breast that feels the fullest   Switch breasts when the baby takes himself off the breast or falls asleep   Keep offering breasts until the baby looks full, no longer gives hunger signs, and stays asleep when placed on his back in the crib   If the baby is sleepy and wont wake for a feeding, put the baby skin-to-skin dressed in a diaper against the mothers bare chest   Sleep near your baby   The baby should be positioned and latched on to the breast correctly  o Chest-to-chest, chin in the breast  o Babys lips are flipped outward  o Babys mouth is stretched open wide like a shout  o Babys sucking should feel like tugging to the mother  - The baby should be drinking at the breast:  o You should hear swallowing or gulping throughout the feeding  o You should see milk on the babys lips when he comes off the breast  o Your breasts should be softer when the baby is finished feeding  o The baby should look relaxed at the end of feedings  o After the 4th day and your milk is in:  o The babys poop should turn bright yellow and be loose, watery, and seedy  o The baby should have at least 3-4 poops the size of the palm of your hand per day  o The baby should have at least 5-6 wet diapers per day  o The urine should be light yellow in color  You should drink when you are thirsty and eat a healthy diet when you are    hungry.     Take naps to get the rest you need.   Take medications and/or drink alcohol only with permission of your obstetrician    or the babys pediatrician.  You can  also call the Infant Risk Center,   ([phone]), Monday-Friday, 8am-5pm Central time, to get the most   up-to-date evidence-based information on the use of medications during   pregnancy and breastfeeding.      The baby should be examined by a pediatrician at 3-5 days of age.  Once your   milk comes in, the baby should be gaining at least ½ - 1oz each day and should be back to birthweight no later than 10-14 days of age.          Community Resources    [facility] Breastfeeding Warmline: [phone]   Local [facility] clinics: provide incentives and breastpumps to eligible mothers  [employer] (LLLI):  mother-to-mother support group website        www.Prognomix.ONTRAPORT  Local La Leche League mother-to-mother support groups:        www.[employer]        La Leche League [facility]   Dr. Neel Sanchez website for latch videos and general information:        www.breastfeedinginc.ca  [facility] Risk Center is a call center that provides information about the safety of taking medications while breastfeeding.  Call 1-894.381.2908, M-F, 8am-5pm, [place].  International Lactation Consultant Association provides resources for assistance:        www.ilca.org  [facility] Breastfeeding Coalition provides informationand resources for parents  and the community    http://[facility], [facility]astfeeding.org     Thais Sandoval is a mom-to-mom support group:                             www.[employer].com//breastfeedng-support/  [employer] for Healthy Babies:  0-453-233-BABY(0818)  [employer]: a breastfeeding support group for women of color, 522.623.1004

## 2017-05-16 ENCOUNTER — PATIENT MESSAGE (OUTPATIENT)
Dept: CARDIOLOGY | Facility: CLINIC | Age: 32
End: 2017-05-16

## 2017-05-16 LAB
BASOPHILS # BLD AUTO: 0.01 K/UL
BASOPHILS NFR BLD: 0.1 %
DIFFERENTIAL METHOD: ABNORMAL
EOSINOPHIL # BLD AUTO: 0 K/UL
EOSINOPHIL NFR BLD: 0.3 %
ERYTHROCYTE [DISTWIDTH] IN BLOOD BY AUTOMATED COUNT: 13.1 %
HCT VFR BLD AUTO: 34 %
HGB BLD-MCNC: 11.2 G/DL
LYMPHOCYTES # BLD AUTO: 1 K/UL
LYMPHOCYTES NFR BLD: 10 %
MCH RBC QN AUTO: 29.7 PG
MCHC RBC AUTO-ENTMCNC: 32.9 %
MCV RBC AUTO: 90 FL
MONOCYTES # BLD AUTO: 0.4 K/UL
MONOCYTES NFR BLD: 4.1 %
NEUTROPHILS # BLD AUTO: 8.6 K/UL
NEUTROPHILS NFR BLD: 85 %
PLATELET # BLD AUTO: 145 K/UL
PMV BLD AUTO: 10.3 FL
RBC # BLD AUTO: 3.77 M/UL
WBC # BLD AUTO: 10.15 K/UL

## 2017-05-16 PROCEDURE — 85025 COMPLETE CBC W/AUTO DIFF WBC: CPT

## 2017-05-16 PROCEDURE — 25000003 PHARM REV CODE 250: Performed by: STUDENT IN AN ORGANIZED HEALTH CARE EDUCATION/TRAINING PROGRAM

## 2017-05-16 PROCEDURE — 36415 COLL VENOUS BLD VENIPUNCTURE: CPT

## 2017-05-16 PROCEDURE — 99232 SBSQ HOSP IP/OBS MODERATE 35: CPT | Mod: ,,, | Performed by: PEDIATRICS

## 2017-05-16 PROCEDURE — 25000003 PHARM REV CODE 250: Performed by: OBSTETRICS & GYNECOLOGY

## 2017-05-16 PROCEDURE — 11000001 HC ACUTE MED/SURG PRIVATE ROOM

## 2017-05-16 RX ORDER — LANOLIN ALCOHOL/MO/W.PET/CERES
400 CREAM (GRAM) TOPICAL EVERY 4 HOURS
Status: COMPLETED | OUTPATIENT
Start: 2017-05-16 | End: 2017-05-16

## 2017-05-16 RX ADMIN — IBUPROFEN 600 MG: 600 TABLET, FILM COATED ORAL at 11:05

## 2017-05-16 RX ADMIN — IBUPROFEN 600 MG: 600 TABLET, FILM COATED ORAL at 02:05

## 2017-05-16 RX ADMIN — Medication 400 MG: at 02:05

## 2017-05-16 RX ADMIN — Medication 400 MG: at 10:05

## 2017-05-16 RX ADMIN — PSYLLIUM HUSK 1 PACKET: 3.4 POWDER ORAL at 02:05

## 2017-05-16 RX ADMIN — Medication 400 MG: at 06:05

## 2017-05-16 RX ADMIN — IBUPROFEN 600 MG: 600 TABLET, FILM COATED ORAL at 06:05

## 2017-05-16 RX ADMIN — IBUPROFEN 600 MG: 600 TABLET, FILM COATED ORAL at 10:05

## 2017-05-16 NOTE — PLAN OF CARE
Problem: Patient Care Overview  Goal: Plan of Care Review  Outcome: Ongoing (interventions implemented as appropriate)  With patient's permission assisted with breastfeeding; developed the following breastfeeding plan of care with patient: she will breastfeed baby on cue until content at least 8 times in 24 hours observing for signs of milk transfer; she will wake baby prn; she will call her RN for assistance with breastfeeding;

## 2017-05-16 NOTE — PROGRESS NOTES
[facility]  Obstetrics  Postpartum Progress Note    Patient Name: Samantha Lopez  MRN: [id number]  Admission Date: 5/14/2017  Hospital Length of Stay: 2 days  Attending Physician: Rasheeda Macias MD  Primary Care Provider: Yessy Fernandez MD    Subjective:     Principal Problem:Indication for care in labor or delivery    Hospital Course:       Interval History:  Samantha Lopez is a 31 y.o. female PPD #1 status post Spontaneous vaginal delivery at 39w5d in a pregnancy complicated by. Patient is doing well this morning. She denies nausea, vomiting, fever or chills.  Patient reports mild abdominal pain that is well relieved by oral pain medications. Lochia is mild and stable. Patient is voiding without difficulty and ambulating with no difficulty. She has passed flatus, and has not had BM.  Patient does plan to breast feed. Did not discuss contraception. She desires circumcision.       Objective:     Vital Signs (Most Recent):  Temp: 99 °F (37.2 °C) (05/15/17 2335)  Pulse: 98 (05/16/17 0200)  Resp: 20 (05/15/17 2335)  BP: 112/68 (05/15/17 2335)  SpO2: 98 % (05/15/17 2335) Vital Signs (24h Range):  Temp:  [98.1 °F (36.7 °C)-99 °F (37.2 °C)] 99 °F (37.2 °C)  Pulse:  [] 98  Resp:  [14-20] 20  SpO2:  [95 %-99 %] 98 %  BP: (103-131)/(56-79) 112/68     Weight: 74 kg (163 lb 2.3 oz)  Body mass index is 24.81 kg/(m^2).      Intake/Output Summary (Last 24 hours) at 05/16/17 0646  Last data filed at 05/15/17 1500   Gross per 24 hour   Intake          1032.83 ml   Output             1300 ml   Net          -267.17 ml       Significant Labs:  Lab Results   Component Value Date    GROUPTRH O POS 05/14/2017    HEPBSAG Negative 09/28/2016    STREPBCULT No Group B Streptococcus isolated 04/20/2017       Recent Labs  Lab 05/16/17  0531   HGB 11.2*   HCT 34.0*       I have personallly reviewed all pertinent lab results from the last 24 hours.    Physical Exam:   Constitutional: She is oriented to person, place, and time.  She appears well-developed and well-nourished. No distress.    HENT:   Head: Normocephalic and atraumatic.      Cardiovascular: Normal rate and regular rhythm.     Pulmonary/Chest: Effort normal. No respiratory distress.        Abdominal: Soft. She exhibits no distension and no abdominal incision. There is no tenderness.             Musculoskeletal: Normal range of motion and moves all extremeties. She exhibits no tenderness.       Neurological: She is alert and oriented to person, place, and time.    Skin: Skin is warm and dry. She is not diaphoretic.    Psychiatric: She has a normal mood and affect.       Assessment/Plan:     31 y.o. female  PPD #1 for:    Adult congenital heart disease  - Currently on continuous telemetry per anesthesia  - continue per their recs     (spontaneous vaginal delivery)  1. Postpartum care:  - Patient doing well. Continue routine management and advances.  - Continue PO pain meds. Pain well controlled.  - Heme: H/h 1237 >   - Encourage ambulation  - Circumcision desired. Consents signed, order placed.  - Contraception deferred  - Lactation breastfeeding      Disposition: As patient meets milestones, will plan to discharge PPD #2.    Kartik Calhoun MD  Obstetrics  [facility]      Patient seen and examined.  Agree with resident assessment and plan.  Stan Jackson Iv

## 2017-05-16 NOTE — PROGRESS NOTES
"2017    re:Samantha Lopez  :1985    [facility]    Samantha Lopez is a 31 y.o. female seen in Mother/Baby today 1 day after delivery of a healthy baby boy.  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  She underwent a right sided BT shunt at 2 months of age followed by a Rastelli operation at 3 years of age, both performed by Dr. Deleon in [facility].  She subsequently underwent replacement of the right ventricle to pulmonary artery conduit with a 22 mm porcine valve conduit on 2007 at [place] in [facility], also by Dr. Deleon.  In the past, she was followed at [facility] by Dr. Olivares.    Interval history:  She delivered her baby yesterday morning.  She tolerated labor very well.  She denies shortness of breath, chest pain, palpitations.  She was on telemetry, but she had no arrhythmia and it is now off.     Scheduled Meds:   ibuprofen  600 mg Oral Q6H    magnesium oxide  400 mg Oral Q4H    psyllium  1 packet Oral Daily     Continuous Infusions:   PRN Meds:.diphenhydrAMINE, docusate sodium, lanolin, measles, mumps and rubella vaccine, ondansetron, oxycodone-acetaminophen, oxycodone-acetaminophen, promethazine (PHENERGAN) IVPB, DIPH,PERTUS (ADACEL),TETANUS PF VAC (ADULT), zolpidem    Review of patient's allergies indicates:  No Known Allergies    BP (!) 100/57  Pulse 89  Temp 98.3 °F (36.8 °C) (Oral)   Resp 18  Ht 5' 8" (1.727 m)  Wt 74 kg (163 lb 2.3 oz)  LMP 08/10/2016 (Exact Date)  SpO2 98%  Breastfeeding? Yes  BMI 24.81 kg/m2  In general, she is a very healthy-appearing nondysmorphic female in no apparent distress.  The eyes, nares, and oropharynx are clear.  Eyelids and conjunctiva are normal without drainage or erythema.  Pupils equal and round bilaterally.  The head is normocephalic and atraumatic.  The neck is supple without " jugular venous distention or thyroid enlargement.  The lungs are clear to auscultation bilaterally.  There is a well-healed right thoracotomy and median sternotomy scar.  The first heart sound is normal.  The second heart sound is fixed and split.  There is a grade 3/6 systolic ejection murmur heard best at the upper left sternal border with radiation to the lung fields bilaterally.  I do not hear a diastolic murmur.  Pulses are normal in all 4 extremities with brisk capillary refill and no clubbing, cyanosis, or edema.  No rashes are noted.    Her echocardiogram in March revealed:  History of DORV with malposed great vessels and PS S/P Rastelli:  Technically challenging study now 31 weeks EGA with study demonstrating no significant change from previous-  Moderately dilated inferior vena cava and right atrium  Mild  tricuspid insufficiency  Mildly dilated right ventricle with qualitatively good right ventricular systolic  function.  No evidence of residual left to right shunt at intracardiac baffle  Valved conduit from RV to pulmonary arteries with peak velocity <2.7 m/sec., mean gradient <18 mmHg and at least mild insufficiency.  LPA with no obvious stenosis.  RPA difficult to demonstrate  Left atrium is compressed by posterior structures.  Left ventricle is normal in size  Paradoxical septal motion with good movement of the left ventricular free wall,  SF= 37% and EF estimated 55-60% from apical four chamber views  The E/e'(lat) is 6, consistent with normal diastolic function.   No evidence of stenosis across VSD with laminar flow throughout intracardiac conduit to aortic valve.   Mild aortic insufficiency arising at central jet.   Large ascending aorta arising anteriorly.    She had CPX testing February 2016.  I reviewed that study.  There were no arrhythmias.  There was no desaturation.  Her exercise tolerance was excellent:  4) The PkVO2 was 40.8 ml/kg/min which is 112% of predicted equating to a functional  capacity of 11.7 METS indicating normal functional status.     I also reviewed a cardiac MRI performed February 16, 2016 in [place].  Excellent biventricular function is noted.  There is no significant enlargement of the right ventricle.  Measurements actually suggest a right ventricular volume less than the left ventricular volume, although I do not believe this.  Unrestricted pulmonary valve insufficiency is noted.  The branch pulmonary arteries look good.  Very mild dilation of the aortic root is noted with a diameter less than 4 cm.    Diagnoses:  1.  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  2.  Status post right ventricle to pulmonary artery conduit replacement in 2007 with a 22 mm porcine valved conduit.  3.  Currently with mild to at worst moderate pulmonary stenosis with estimated right ventricular pressures about half systemic and likely mild pulmonary insufficiency.  There is excellent right ventricular function and at worst mild right ventricular enlargement.  4.  Very reassuring CPX testing and cardiac MRI 2016 prior to pregnancy.  5.  Mild dilation of the aortic root without aortic insufficiency.  This is typical with her disease and needs to be followed long-term.  6.  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  7.  Now s/p delivery of a healthy baby boy on 5/15.    Discussion:  She did very well as expected.      Plan:  1.  restart aspirin once OK with OB  2.  Endocarditis prophylaxis is recommended prior to dental work.  3.  Continue regular exercise (once recovered from delivery) and healthy diet.  4.  follow up with me in 6 weeks with an ekg only (I will have my nurse call her to schedule).  Will repeat an MRI in about a year.     Thank you for referring this patient to our clinic.  Please call with any  questions.    Sincerely,        Be Levine MD  Pediatric Cardiology  Adult Congenital Heart Disease  Pediatric Heart Failure and Transplantation  [facility] [address] [place]  [address]  [phone]

## 2017-05-16 NOTE — PLAN OF CARE
Problem: Patient Care Overview  Goal: Plan of Care Review  Outcome: Ongoing (interventions implemented as appropriate)  VSS. Ambulating and voiding without difficulty. Fundus is firm and midline. Vaginal bleeding is small. Tolerating a regular diet. Patient's pain is minimal; pt has refused pain medication. Patient has been given ice packs for discomfort to perineum. Telemetry monitor in place.  Mother baby care guide at bedside. Patient and spouse encouraged to read booklet and ask questions as needed. Mother breastfeeding infant with RN assistance. Will continue to monitor.

## 2017-05-16 NOTE — LACTATION NOTE
"   05/16/17 1510   Maternal Infant Assessment   Breast Density soft;Bilateral:   Areola elastic;Bilateral:   Nipple(s) graspable;Bilateral:   LATCH Score   Latch 2-->grasps breast, tongue down, lips flanged, rhythmic sucking   Audible Swallowing 0-->none   Type Of Nipple 2-->everted (after stimulation)   Comfort (Breast/Nipple) 2-->soft/nontender   Hold (Positioning) 1-->minimal assist, teach one side: mother does other, staff holds   Score (less than 7 for 2/more consecutive times, consult Lactation Consultant) 7   Pain/Comfort Assessments   Pain Assessment Performed Yes       Number Scale   Presence of Pain denies   Location - Side Bilateral   Location nipple(s)   Pain Rating: Activity 0   Maternal Infant Feeding   Maternal Emotional State assist needed;independent   Infant Positioning cross-cradle;clutch/"football"   Time Spent (min) 15-30 min   Comfort Measures Following Feeding expressed milk applied   Latch Assistance yes   Breastfeeding Education adequate infant intake;importance of skin-to-skin contact   Feeding Infant   Feeding Readiness Cues finger sucking;rooting   Feeding Tolerance/Success alert for feeding   Effective Latch During Feeding yes   Lactation Referrals   Lactation Consult Breastfeeding assessment;Follow up;Knowledge deficit   Lactation Interventions   Attachment Promotion breastfeeding assistance provided;counseling provided;environment adjusted;face-to-face positioning promoted;family involvement promoted;infant-mother separation minimized;privacy provided;role responsibility promoted;rooming-in promoted;skin-to-skin contact encouraged   Breast Care: Breastfeeding lanolin to nipple(s) applied   Breastfeeding Assistance assisted with positioning;both breasts offered each feeding;feeding cue recognition promoted;feeding session observed;infant latch-on verified;support offered   Maternal Breastfeeding Support encouragement offered;infant-mother separation minimized;lactation counseling " provided;maternal hydration promoted;maternal nutrition promoted;maternal rest encouraged   Latch Promotion positioning assisted;infant moved to breast   With patient's permission assisted with breastfeeding, latching baby to right (second) breast in cross cradle then football positions; patient becoming more capable and independently latched baby to right breast in football position; baby actively sucking with wide mouth pauses; cued patient to use breast compression and infant stimulation prn; patient's partner at bedside offering support and assistance; praise and encouragement provided;

## 2017-05-16 NOTE — ANESTHESIA POSTPROCEDURE EVALUATION
"Anesthesia Post Evaluation    Patient: Samantha Lopez    Procedure(s) Performed: * No procedures listed *    Final Anesthesia Type: epidural  Patient location during evaluation: floor  Patient participation: Yes- Able to Participate  Level of consciousness: awake and alert  Post-procedure vital signs: reviewed and stable  Pain management: adequate  Airway patency: patent  PONV status at discharge: No PONV  Anesthetic complications: no      Cardiovascular status: blood pressure returned to baseline and hemodynamically stable  Respiratory status: unassisted  Hydration status: euvolemic  Follow-up not needed.        Visit Vitals    BP (!) 110/59    Pulse 96    Temp 36.5 °C (97.7 °F) (Oral)    Resp 18    Ht 5' 8" (1.727 m)    Wt 74 kg (163 lb 2.3 oz)    LMP 08/10/2016 (Exact Date)    SpO2 98%    Breastfeeding Yes    BMI 24.81 kg/m2       Pain/Deepika Score: Pain Rating Prior to Med Admin: 3 (5/16/2017 10:11 AM)  Pain Rating Post Med Admin: 0 (5/16/2017 11:10 AM)      "

## 2017-05-16 NOTE — ASSESSMENT & PLAN NOTE
1. Postpartum care:  - Patient doing well. Continue routine management and advances.  - Continue PO pain meds. Pain well controlled.  - Heme: H/h 12/37 > 11/34  - Encourage ambulation  - Circumcision desired. Consents signed, order placed.  - Contraception deferred  - Lactation breastfeeding

## 2017-05-16 NOTE — SUBJECTIVE & OBJECTIVE
Interval History:  Samantha Lopez is a 31 y.o. female PPD #1 status post Spontaneous vaginal delivery at 39w5d in a pregnancy complicated by. Patient is doing well this morning. She denies nausea, vomiting, fever or chills.  Patient reports mild abdominal pain that is well relieved by oral pain medications. Lochia is mild and stable. Patient is voiding without difficulty and ambulating with no difficulty. She has passed flatus, and has not had BM.  Patient does plan to breast feed. Did not discuss contraception. She desires circumcision.       Objective:     Vital Signs (Most Recent):  Temp: 99 °F (37.2 °C) (05/15/17 2335)  Pulse: 98 (05/16/17 0200)  Resp: 20 (05/15/17 2335)  BP: 112/68 (05/15/17 2335)  SpO2: 98 % (05/15/17 2335) Vital Signs (24h Range):  Temp:  [98.1 °F (36.7 °C)-99 °F (37.2 °C)] 99 °F (37.2 °C)  Pulse:  [] 98  Resp:  [14-20] 20  SpO2:  [95 %-99 %] 98 %  BP: (103-131)/(56-79) 112/68     Weight: 74 kg (163 lb 2.3 oz)  Body mass index is 24.81 kg/(m^2).      Intake/Output Summary (Last 24 hours) at 05/16/17 0646  Last data filed at 05/15/17 1500   Gross per 24 hour   Intake          1032.83 ml   Output             1300 ml   Net          -267.17 ml       Significant Labs:  Lab Results   Component Value Date    GROUPTRH O POS 05/14/2017    HEPBSAG Negative 09/28/2016    STREPBCULT No Group B Streptococcus isolated 04/20/2017       Recent Labs  Lab 05/16/17  0531   HGB 11.2*   HCT 34.0*       I have personallly reviewed all pertinent lab results from the last 24 hours.    Physical Exam:   Constitutional: She is oriented to person, place, and time. She appears well-developed and well-nourished. No distress.    HENT:   Head: Normocephalic and atraumatic.      Cardiovascular: Normal rate and regular rhythm.     Pulmonary/Chest: Effort normal. No respiratory distress.        Abdominal: Soft. She exhibits no distension and no abdominal incision. There is no tenderness.             Musculoskeletal: Normal  range of motion and moves all extremeties. She exhibits no tenderness.       Neurological: She is alert and oriented to person, place, and time.    Skin: Skin is warm and dry. She is not diaphoretic.    Psychiatric: She has a normal mood and affect.

## 2017-05-16 NOTE — PROGRESS NOTES
Dr. CONNIE Butler notified of patient's request for telemetry to be discontinued. MD states that it is ok to d/c order for cardiac monitoring. However, patient's vital signs should be taken every 4 hours. Orders entered. Will continue to monitor.

## 2017-05-16 NOTE — PROGRESS NOTES
MD notified that patient reports diarrhea. Patient's  thinks that it might be due to magnesium pill. Per MD, continue magnesium pill until discontinued. Patient may take metamucil at this time. Order entered.

## 2017-05-17 ENCOUNTER — PATIENT MESSAGE (OUTPATIENT)
Dept: PEDIATRIC CARDIOLOGY | Facility: CLINIC | Age: 32
End: 2017-05-17

## 2017-05-17 VITALS
HEART RATE: 104 BPM | RESPIRATION RATE: 18 BRPM | HEIGHT: 68 IN | WEIGHT: 163.13 LBS | SYSTOLIC BLOOD PRESSURE: 98 MMHG | BODY MASS INDEX: 24.72 KG/M2 | OXYGEN SATURATION: 96 % | DIASTOLIC BLOOD PRESSURE: 65 MMHG | TEMPERATURE: 98 F

## 2017-05-17 PROCEDURE — 25000003 PHARM REV CODE 250: Performed by: STUDENT IN AN ORGANIZED HEALTH CARE EDUCATION/TRAINING PROGRAM

## 2017-05-17 RX ORDER — IBUPROFEN 600 MG/1
600 TABLET ORAL EVERY 6 HOURS PRN
Qty: 30 TABLET | Refills: 3 | Status: SHIPPED | OUTPATIENT
Start: 2017-05-17 | End: 2018-06-21

## 2017-05-17 RX ADMIN — IBUPROFEN 600 MG: 600 TABLET, FILM COATED ORAL at 05:05

## 2017-05-17 NOTE — ASSESSMENT & PLAN NOTE
1. Postpartum care:  - Patient doing well. Continue routine management and advances.  - Continue PO pain meds. Pain well controlled.  - Heme: H/h 12/37 > 11/34  - Encourage ambulation  - Circumcision deferred by peds  - Contraception deferred  - Lactation breastfeeding

## 2017-05-17 NOTE — SUBJECTIVE & OBJECTIVE
Interval History:  Samantha Lopez is a 31 y.o. female PPD #2 status post Spontaneous vaginal delivery at 39w5d in a pregnancy complicated by. Patient is doing well this morning. She denies nausea, vomiting, fever or chills.  Patient reports mild abdominal pain that is well relieved by oral pain medications. Lochia is mild and stable. Patient is voiding without difficulty and ambulating with no difficulty. She has passed flatus, and has not had BM.  Patient does plan to breast feed. Did not discuss contraception.        Objective:     Vital Signs (Most Recent):  Temp: 97.4 °F (36.3 °C) (05/17/17 0320)  Pulse: 75 (05/17/17 0320)  Resp: 16 (05/17/17 0320)  BP: (!) 104/58 (05/17/17 0320)  SpO2: 96 % (05/17/17 0320) Vital Signs (24h Range):  Temp:  [97.4 °F (36.3 °C)-98.3 °F (36.8 °C)] 97.4 °F (36.3 °C)  Pulse:  [75-97] 75  Resp:  [16-19] 16  SpO2:  [96 %-99 %] 96 %  BP: (100-112)/(57-93) 104/58     Weight: 74 kg (163 lb 2.3 oz)  Body mass index is 24.81 kg/(m^2).    No intake or output data in the 24 hours ending 05/17/17 0649    Significant Labs:  Lab Results   Component Value Date    GROUPTRH O POS 05/14/2017    HEPBSAG Negative 09/28/2016    STREPBCULT No Group B Streptococcus isolated 04/20/2017       Recent Labs  Lab 05/16/17  0531   HGB 11.2*   HCT 34.0*       I have personallly reviewed all pertinent lab results from the last 24 hours.    Physical Exam:   Constitutional: She is oriented to person, place, and time. She appears well-developed and well-nourished. No distress.    HENT:   Head: Normocephalic and atraumatic.      Cardiovascular: Normal rate and regular rhythm.     Pulmonary/Chest: Effort normal. No respiratory distress.        Abdominal: Soft. She exhibits no distension and no abdominal incision. There is no tenderness.             Musculoskeletal: Normal range of motion and moves all extremeties. She exhibits no tenderness.       Neurological: She is alert and oriented to person, place, and time.     Skin: Skin is warm and dry. She is not diaphoretic.    Psychiatric: She has a normal mood and affect.

## 2017-05-17 NOTE — PROGRESS NOTES
Patient received after visit summary and verbalized understanding of discharge instructions. Patient escorted to vehicle in wheelchair with infant in arms.

## 2017-05-17 NOTE — LACTATION NOTE
"   05/17/17 1100   Maternal Infant Assessment   Breast Shape Bilateral:;round   Breast Density Bilateral:;filling   Nipple(s) Bilateral:;everted;graspable   Infant Assessment   Sucking Reflex present   Rooting Reflex present   Swallow Reflex present   LATCH Score   Latch 2-->grasps breast, tongue down, lips flanged, rhythmic sucking   Audible Swallowing 2-->spontaneous and intermittent (24 hrs old)   Type Of Nipple 2-->everted (after stimulation)   Comfort (Breast/Nipple) 1-->filling, red/small blisters/bruises, mild/mod discomfort   Hold (Positioning) 1-->minimal assist, teach one side: mother does other, staff holds   Score (less than 7 for 2/more consecutive times, consult Lactation Consultant) 8   Maternal Infant Feeding   Maternal Emotional State assist needed;relaxed   Infant Positioning cross-cradle;clutch/"football"   Time Spent (min) 15-30 min   Latch Assistance yes  (pt able to then latch baby independently)   Breastfeeding History   Currently Breastfeeding yes   Feeding Infant   Effective Latch During Feeding yes   Audible Swallow yes   Suck/Swallow Coordination present   Lactation Referrals   Lactation Consult Breastfeeding assessment;Follow up   Lactation Interventions   Attachment Promotion breastfeeding assistance provided;counseling provided;skin-to-skin contact encouraged   Breastfeeding Assistance feeding cue recognition promoted;infant latch-on verified;infant suck/swallow verified;assisted with positioning;support offered;feeding session observed   Maternal Breastfeeding Support lactation counseling provided   Latch Promotion positioning assisted;infant moved to breast   discharge breastfeeding education given. questions answered. Assisted pt with latch. Baby latches easily to breast. Pt shown how to keep baby actively nursing with breast compression and stimulation. Rhythmic sucking observed. Many swallows audible. Pt has lactation contact number.  "

## 2017-05-17 NOTE — PROGRESS NOTES
[facility]  Obstetrics  Postpartum Progress Note    Patient Name: Samantha Lopez  MRN: [id number]  Admission Date: 5/14/2017  Hospital Length of Stay: 3 days  Attending Physician: Rasheeda Macias MD  Primary Care Provider: Yessy Fernandez MD    Subjective:     Principal Problem:Indication for care in labor or delivery    Hospital Course:       Interval History:  Samantha Lopez is a 31 y.o. female PPD #2 status post Spontaneous vaginal delivery at 39w5d in a pregnancy complicated by. Patient is doing well this morning. She denies nausea, vomiting, fever or chills.  Patient reports mild abdominal pain that is well relieved by oral pain medications. Lochia is mild and stable. Patient is voiding without difficulty and ambulating with no difficulty. She has passed flatus, and has not had BM.  Patient does plan to breast feed. Did not discuss contraception.        Objective:     Vital Signs (Most Recent):  Temp: 97.4 °F (36.3 °C) (05/17/17 0320)  Pulse: 75 (05/17/17 0320)  Resp: 16 (05/17/17 0320)  BP: (!) 104/58 (05/17/17 0320)  SpO2: 96 % (05/17/17 0320) Vital Signs (24h Range):  Temp:  [97.4 °F (36.3 °C)-98.3 °F (36.8 °C)] 97.4 °F (36.3 °C)  Pulse:  [75-97] 75  Resp:  [16-19] 16  SpO2:  [96 %-99 %] 96 %  BP: (100-112)/(57-93) 104/58     Weight: 74 kg (163 lb 2.3 oz)  Body mass index is 24.81 kg/(m^2).    No intake or output data in the 24 hours ending 05/17/17 0649    Significant Labs:  Lab Results   Component Value Date    GROUPTRH O POS 05/14/2017    HEPBSAG Negative 09/28/2016    STREPBCULT No Group B Streptococcus isolated 04/20/2017       Recent Labs  Lab 05/16/17  0531   HGB 11.2*   HCT 34.0*       I have personallly reviewed all pertinent lab results from the last 24 hours.    Physical Exam:   Constitutional: She is oriented to person, place, and time. She appears well-developed and well-nourished. No distress.    HENT:   Head: Normocephalic and atraumatic.      Cardiovascular: Normal rate and regular  rhythm.     Pulmonary/Chest: Effort normal. No respiratory distress.        Abdominal: Soft. She exhibits no distension and no abdominal incision. There is no tenderness.             Musculoskeletal: Normal range of motion and moves all extremeties. She exhibits no tenderness.       Neurological: She is alert and oriented to person, place, and time.    Skin: Skin is warm and dry. She is not diaphoretic.    Psychiatric: She has a normal mood and affect.       Assessment/Plan:     31 y.o. female  PPD #2 for:    Adult congenital heart disease  - s/p telemetry     (spontaneous vaginal delivery)  1. Postpartum care:  - Patient doing well. Continue routine management and advances.  - Continue PO pain meds. Pain well controlled.  - Heme: H/h  >   - Encourage ambulation  - Circumcision deferred by peds  - Contraception deferred  - Lactation breastfeeding      Disposition: As patient meets milestones, will plan to discharge PPD 2.    Kartik Calhoun MD  Obstetrics  [facility]

## 2017-05-17 NOTE — DISCHARGE SUMMARY
Delivery Discharge Summary  Obstetrics      Primary OB Clinician: Gilberto    Admission date: 2017  Discharge date: 2017    Disposition: To home, self care    Admit Dx:      Patient Active Problem List   Diagnosis    Nevus    Supervision of high risk pregnancy, antepartum - flu shot/tdap/breast/undecided.    Double outlet right ventricle    Pulmonary valve replaced    Pulmonary stenosis    Adult congenital heart disease    Personal history of surgery to heart and great vessels, presenting hazards to health     (spontaneous vaginal delivery)     Discharge Dx:    Patient Active Problem List   Diagnosis    Nevus    Supervision of high risk pregnancy, antepartum - flu shot/tdap/breast/undecided.    Double outlet right ventricle    Pulmonary valve replaced    Pulmonary stenosis    Adult congenital heart disease    Personal history of surgery to heart and great vessels, presenting hazards to health     (spontaneous vaginal delivery)       Procedure:     Hospital Course:  Samantha Lopez is a 31 y.o. now , PPD #2 who was admitted on 2017 at 39.4 wga for IOL at term. On initial assessment, vital signs were stable and physical exam was normal. Infant was in cephalic presentation. Patient was subsequently admitted to labor and delivery unit with signed consents. Labor course was managed with continuous telemetry, cytotec, pitocin, and AROM.  Patient delivered a single viable  male. Please see delivery note for further details. Pt was in stable condition post delivery and was transferred to the Mother-Baby Unit. Her postpartum course was uncomplicated. On discharge day, patient's pain is controlled with oral pain medications. Pt is tolerating ambulation without SOB or CP, and PO diet without N/V. Reports lochia is mild. Denies any HA, vision changes, F/C, LE swelling. Denies any breast pain/soreness.  Pt in stable condition and ready for discharge. She has been instructed to  continue home medications as indicated as well as pain medications as needed and to follow up in the OB clinic in 6 weeks with her obstetrics provider.    Pertinent studies:  Postpartum CBC  Lab Results   Component Value Date    WBC 10.15 05/16/2017    HGB 11.2 (L) 05/16/2017    HCT 34.0 (L) 05/16/2017    MCV 90 05/16/2017     (L) 05/16/2017       Tubal Ligation: n/a  Feeding Method: breast  Rh Immune Globulin Given(O POS): N/A    Delivery:    Episiotomy: None   Lacerations: 1st   Repair suture:     Repair # of packets: 4   Blood loss (ml): 417     Birth information:  YOB: 2018   Time of birth: 5:22 AM   Sex: male   Delivery type: Vaginal, Spontaneous Delivery   Gestational Age: 39w5d    Delivery Clinician:      Other providers:       Additional  information:  Forceps:    Vacuum:    Breech:    Observed anomalies      Living?:           APGARS  One minute Five minutes Ten minutes   Skin color:         Heart rate:         Grimace:         Muscle tone:         Breathing:         Totals: 7  9        Placenta: Delivered:       appearance      Patient Instructions:   Current Discharge Medication List      START taking these medications    Details   ibuprofen (ADVIL,MOTRIN) 600 MG tablet Take 1 tablet (600 mg total) by mouth every 6 (six) hours as needed.  Qty: 30 tablet, Refills: 3         CONTINUE these medications which have NOT CHANGED    Details   PNV 11-iron fum-folic acid-om3 (VIVA DHA) 28-1-200 mg Cap Take 1 capsule by mouth once daily.  Qty: 30 each, Refills: 12         STOP taking these medications       valacyclovir (VALTREX) 500 MG tablet Comments:   Reason for Stopping:                 Discharge Procedure Orders  Diet general     Activity as tolerated     Call MD for:  temperature >100.4     Call MD for:  persistent nausea and vomiting or diarrhea     Call MD for:  severe uncontrolled pain     Call MD for:  redness, tenderness, or signs of infection (pain, swelling, redness, odor or  green/yellow discharge around incision site)     Call MD for:  difficulty breathing or increased cough     Call MD for:  severe persistent headache     Call MD for:  persistent dizziness, light-headedness, or visual disturbances     Call MD for:  increased confusion or weakness         Follow-up Information     Follow up with Rasheeda Macias MD. Schedule an appointment as soon as possible for a visit in 6 weeks.    Specialties:  Obstetrics, Obstetrics and Gynecology    Contact information:    [address] [place] [address] [id number] [phone]          Chris Calhoun MD  PGY-1 OB/GYN  [phone]

## 2017-05-19 ENCOUNTER — TELEPHONE (OUTPATIENT)
Dept: OBSTETRICS AND GYNECOLOGY | Facility: CLINIC | Age: 32
End: 2017-05-19

## 2017-05-19 NOTE — TELEPHONE ENCOUNTER
----- Message from Rachelle Hughes sent at 5/19/2017  1:10 PM CDT -----  Contact: Tobias WYNN  1st Request  _  2nd Request  _  3rd Request    Who:Tobias     Why: Patients  Tobias states post delivery his wife has been experiencing abdominal cramping and diarrhea...... Please contact to further discuss and advise     What Number to Call Back:[phone]     When to Expect a call back: (Before the end of the day)   -- if call after 3:00 call back will be tomorrow.

## 2017-07-21 ENCOUNTER — PATIENT MESSAGE (OUTPATIENT)
Dept: CARDIOLOGY | Facility: CLINIC | Age: 32
End: 2017-07-21

## 2017-08-03 ENCOUNTER — PATIENT MESSAGE (OUTPATIENT)
Dept: PEDIATRIC CARDIOLOGY | Facility: CLINIC | Age: 32
End: 2017-08-03

## 2017-08-03 ENCOUNTER — OFFICE VISIT (OUTPATIENT)
Dept: CARDIOLOGY | Facility: CLINIC | Age: 32
End: 2017-08-03
Payer: COMMERCIAL

## 2017-08-03 ENCOUNTER — HOSPITAL ENCOUNTER (OUTPATIENT)
Dept: CARDIOLOGY | Facility: CLINIC | Age: 32
Discharge: HOME | End: 2017-08-03
Payer: COMMERCIAL

## 2017-08-03 VITALS
HEIGHT: 69 IN | BODY MASS INDEX: 21.06 KG/M2 | WEIGHT: 142.19 LBS | OXYGEN SATURATION: 97 % | SYSTOLIC BLOOD PRESSURE: 97 MMHG | DIASTOLIC BLOOD PRESSURE: 66 MMHG | HEART RATE: 61 BPM

## 2017-08-03 DIAGNOSIS — Z95.2 PULMONARY VALVE REPLACED: ICD-10-CM

## 2017-08-03 DIAGNOSIS — Q20.1 DOUBLE OUTLET RIGHT VENTRICLE: Primary | ICD-10-CM

## 2017-08-03 DIAGNOSIS — I37.0 NONRHEUMATIC PULMONARY VALVE STENOSIS: ICD-10-CM

## 2017-08-03 DIAGNOSIS — Q24.9 ADULT CONGENITAL HEART DISEASE: ICD-10-CM

## 2017-08-03 PROCEDURE — 93000 ELECTROCARDIOGRAM COMPLETE: CPT | Mod: S$GLB,,, | Performed by: INTERNAL MEDICINE

## 2017-08-03 PROCEDURE — 99213 OFFICE O/P EST LOW 20 MIN: CPT | Mod: S$GLB,,, | Performed by: PEDIATRICS

## 2017-08-03 PROCEDURE — 99999 PR PBB SHADOW E&M-EST. PATIENT-LVL III: CPT | Mod: PBBFAC,,, | Performed by: PEDIATRICS

## 2017-08-03 PROCEDURE — 3008F BODY MASS INDEX DOCD: CPT | Mod: S$GLB,,, | Performed by: PEDIATRICS

## 2017-08-03 RX ORDER — NAPROXEN SODIUM 220 MG/1
81 TABLET, FILM COATED ORAL DAILY
Refills: 0 | COMMUNITY
Start: 2017-08-03 | End: 2023-04-27

## 2017-08-03 NOTE — PROGRESS NOTES
2017    re:Samantha Lopez  :1985    Yessy Fernandez MD  [address] [facility] [address]    [facility]     Samantha Lopez is a 32 y.o. 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  She underwent a right sided BT shunt at 2 months of age followed by a Rastelli operation at 3 years of age, both performed by Dr. Deleon in [facility].  She subsequently underwent replacement of the right ventricle to pulmonary artery conduit with a 22 mm porcine valve conduit on 2007 in [place], also by Dr. Deleon.  Until last year, she was followed at [facility] by Dr. Olivares.    Interval history:  She delivered a healthy baby boy, Duke, 11 weeks ago.  She did well without pregnancy and delivery.  The baby is doing extremely well at home.  She feels great.  She denies chest pain, palpitations, syncope, near-syncope, cyanosis, and edema.    The review of systems is as noted above. It is otherwise negative for other symptoms related to the general, neurological, psychiatric, endocrine, gastrointestinal, genitourinary, respiratory, dermatologic, musculoskeletal, hematologic, and immunologic systems.    Past Medical History:   Diagnosis Date    Double outlet right ventricle     Migraine headache      Past Surgical History:   Procedure Laterality Date    ROSENDO PROCEDURE      BT shunt in [facility] by Dr. Deleon at 2 months of age    NASAL SEPTUM SURGERY      RASTELLI PROCEDURE      in [facility] by Dr. Deleon at 3 years of age    RV to PA conduit  2007    at [place] by Dr. Deleon - 22mm porcine valved conduit     Family History   Problem Relation Age of Onset    Breast cancer Maternal Aunt 40     bilateral mastectomy    Stroke Maternal Grandmother     Stroke Maternal  "Grandfather     Colon cancer Neg Hx     Diabetes Neg Hx     Hypertension Neg Hx     Ovarian cancer Neg Hx     Congenital heart disease Neg Hx      Social History     Social History    Marital status:      Spouse name: N/A    Number of children: N/A    Years of education: N/A     Social History Main Topics    Smoking status: Never Smoker    Smokeless tobacco: Never Used    Alcohol use Yes      Comment: alcohol use socially    Drug use: No    Sexual activity: Yes     Partners: Male     Birth control/ protection: OCP     Other Topics Concern    None     Social History Narrative    She has a PhD from [employer].  She is  and lives in [place].  Her  is an internist at [employer].  She also works at [employer].     Current Outpatient Prescriptions on File Prior to Visit   Medication Sig Dispense Refill    ibuprofen (ADVIL,MOTRIN) 600 MG tablet Take 1 tablet (600 mg total) by mouth every 6 (six) hours as needed. 30 tablet 3    PNV 11-iron fum-folic acid-om3 (VIVA DHA) 28-1-200 mg Cap Take 1 capsule by mouth once daily. 30 each 12     No current facility-administered medications on file prior to visit.      Review of patient's allergies indicates:  No Known Allergies    BP 97/66 (BP Location: Left arm, Patient Position: Sitting, BP Method: Automatic)   Pulse 61   Ht 5' 8.5" (1.74 m)   Wt 64.5 kg (142 lb 3.2 oz)   LMP 08/10/2016 (Exact Date)   SpO2 97%   BMI 21.31 kg/m²   In general, she is a very healthy-appearing nondysmorphic female in no apparent distress.  The eyes, nares, and oropharynx are clear.  Eyelids and conjunctiva are normal without drainage or erythema.  Pupils equal and round bilaterally.  The head is normocephalic and atraumatic.  The neck is supple without jugular venous distention or thyroid enlargement.  The lungs are clear to auscultation bilaterally.  There is a well-healed right thoracotomy and median sternotomy scar.  The first " heart sound is normal.  The second heart sound is fixed and split.  There is a grade 3/6 systolic ejection murmur heard best at the upper left sternal border with radiation to the lung fields bilaterally.  I do not hear a diastolic murmur.  The abdominal exam is benign without hepatosplenomegaly, tenderness, or distention.  Pulses are normal in all 4 extremities with brisk capillary refill and no clubbing, cyanosis, or edema.  No rashes are noted.    I personally reviewed the following tests performed today and my interpretation follows:  EKG reveals normal sinus rhythm with an incomplete right bundle branch block pattern.      Her echocardiogram from March 2017 ago revealed:  History of DORV with malposed great vessels and PS S/P Rastelli:  Technically challenging study now 31 weeks EGA with study demonstrating no significant change from previous-  Moderately dilated inferior vena cava and right atrium  Mild  tricuspid insufficiency at velocity suggesting right ventricular pressure >60 mmHg.   Mildly dilated right ventricle with qualitatively good right ventricular systolic  function.  No evidence of residual left to right shunt at intracardiac baffle  Valved conduit from RV to pulmonary arteries with peak velocity <2.7 m/sec., mean gradient <18 mmHg and at least mild insufficiency.  LPA with no obvious stenosis.  RPA difficult to demonstrate  Left atrium is compressed by posterior structures.  Left ventricle is normal in size  Paradoxical septal motion with good movement of the left ventricular free wall,  SF= 37% and EF estimated 55-60% from apical four chamber views  The E/e'(lat) is 6, consistent with normal diastolic function.   No evidence of stenosis across VSD with laminar flow throughout intracardiac conduit to aortic valve.   Mild aortic insufficiency arising at central jet.   Large ascending aorta arising anteriorly.    I reviewed the echocardiogram and compared it to the results from her evaluation at  [place] November 6, 2015.  It appears to be the exact same.  A peak gradient less than 36 mmHg is obtained across the conduit.  The tricuspid insufficiency estimates a right ventricular systolic pressure around 50.  The right ventricle is at most mildly enlarged with good function.  There is mild enlargement of the aortic root, but no aortic insufficiency.    She had CPX testing February 2016.  I reviewed that study.  There were no arrhythmias.  There was no desaturation.  Her exercise tolerance was excellent:  4) The PkVO2 was 40.8 ml/kg/min which is 112% of predicted equating to a functional capacity of 11.7 METS indicating normal functional status.     I also reviewed a cardiac MRI performed February 16, 2016 in [place].  Excellent biventricular function is noted.  There is no significant enlargement of the right ventricle.  Measurements actually suggest a right ventricular volume less than the left ventricular volume, although I do not believe this.  Unrestricted pulmonary valve insufficiency is noted.  The branch pulmonary arteries look good.  Very mild dilation of the aortic root is noted with a diameter less than 4 cm.    Diagnoses:  1.  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  2.  Status post right ventricle to pulmonary artery conduit replacement in 2007 with a 22 mm porcine valved conduit.  3.  Currently with mild to at worst moderate pulmonary stenosis with estimated right ventricular pressures less than half systemic and likely mild pulmonary insufficiency.  There is excellent right ventricular function and at worst mild right ventricular enlargement.  4.  Very reassuring CPX testing and cardiac MRI 2016 prior to pregnancy.  5.  Mild dilation of the aortic root without aortic insufficiency.  This is typical with her disease and needs to  be followed long-term.  6.  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  7.  Now s/p first pregnancy - no complications.    Discussion:  She has very well repaired congenital heart disease.  In the future, she will require replacement of her pulmonary valve, but I hope that this can be accomplished in the catheterization laboratory.  At present, there is no indication for valve replacement as her right ventricle is not significantly enlarged, she has excellent function, the stenosis is not severe, and her insufficiency is no more than mild.  Given her history of congenital heart disease, she is at risk for arrhythmia as well.    Plan:  1.  Continue baby aspirin.  2.  Endocarditis prophylaxis is recommended prior to dental work.  3.  Continue regular exercise and healthy diet.  4.  Return to clinic in about 6 months with repeat echo, CPX and a cardiac MRI    Thank you for referring this patient to our clinic.  Please call with any questions.    Sincerely,        Be Levine MD  Pediatric Cardiology  Adult Congenital Heart Disease  Pediatric Heart Failure and Transplantation  [facility] [address] [place]  [address]  [phone]

## 2018-03-21 ENCOUNTER — PATIENT MESSAGE (OUTPATIENT)
Dept: CARDIOLOGY | Facility: CLINIC | Age: 33
End: 2018-03-21

## 2018-03-26 DIAGNOSIS — Z87.74 STATUS POST SURGERY FOR COMPLEX CONGENITAL HEART DISEASE: ICD-10-CM

## 2018-03-26 DIAGNOSIS — Q20.1 DORV, TGA TYPE (DOUBLE-OUTLET RV, TRANSPOSITION OF THE GREAT ARTERIES): Primary | ICD-10-CM

## 2018-03-26 DIAGNOSIS — Q21.0 VSD (VENTRICULAR SEPTAL DEFECT): ICD-10-CM

## 2018-03-26 DIAGNOSIS — I37.0 PULMONARY VALVE STENOSIS, UNSPECIFIED ETIOLOGY: ICD-10-CM

## 2018-03-26 DIAGNOSIS — Q20.3 DORV, TGA TYPE (DOUBLE-OUTLET RV, TRANSPOSITION OF THE GREAT ARTERIES): Primary | ICD-10-CM

## 2018-04-03 ENCOUNTER — PATIENT MESSAGE (OUTPATIENT)
Dept: CARDIOLOGY | Facility: CLINIC | Age: 33
End: 2018-04-03

## 2018-04-25 ENCOUNTER — TELEPHONE (OUTPATIENT)
Dept: PEDIATRIC CARDIOLOGY | Facility: CLINIC | Age: 33
End: 2018-04-25

## 2018-04-25 NOTE — TELEPHONE ENCOUNTER
----- Message from Justyna Zendejas RN sent at 4/3/2018  1:21 PM CDT -----  Can you schedule for MRI please    Order is in

## 2018-04-25 NOTE — TELEPHONE ENCOUNTER
Spoke to pt and scheduled MRI for 5/31 at 1030 AM. Directions to MRI and instructions discussed. Pt verbalized understanding.

## 2018-05-20 ENCOUNTER — PATIENT MESSAGE (OUTPATIENT)
Dept: CARDIOLOGY | Facility: CLINIC | Age: 33
End: 2018-05-20

## 2018-05-21 ENCOUNTER — TELEPHONE (OUTPATIENT)
Dept: PEDIATRIC CARDIOLOGY | Facility: CLINIC | Age: 33
End: 2018-05-21

## 2018-05-21 DIAGNOSIS — Q20.1 DORV (DOUBLE OUTLET RIGHT VENTRICLE): Primary | ICD-10-CM

## 2018-05-21 NOTE — TELEPHONE ENCOUNTER
Scheduled pt appt for 6/21/18 @ 1;45pm with echo and EKG.  ----- Message from Be Levine MD sent at 5/21/2018 12:30 PM CDT -----  Can you schedule her for an echocardiogram and EKG in my adult congenital clinic in June?  Cancel the MRI that was scheduled (she is pregnant).

## 2018-06-21 ENCOUNTER — HOSPITAL ENCOUNTER (OUTPATIENT)
Dept: CARDIOLOGY | Facility: CLINIC | Age: 33
Discharge: HOME | End: 2018-06-21
Payer: COMMERCIAL

## 2018-06-21 ENCOUNTER — HOSPITAL ENCOUNTER (OUTPATIENT)
Dept: CARDIOLOGY | Facility: CLINIC | Age: 33
Discharge: HOME | End: 2018-06-21
Attending: PEDIATRICS
Payer: COMMERCIAL

## 2018-06-21 ENCOUNTER — OFFICE VISIT (OUTPATIENT)
Dept: CARDIOLOGY | Facility: CLINIC | Age: 33
End: 2018-06-21
Payer: COMMERCIAL

## 2018-06-21 VITALS
HEART RATE: 76 BPM | HEIGHT: 68 IN | DIASTOLIC BLOOD PRESSURE: 70 MMHG | OXYGEN SATURATION: 98 % | BODY MASS INDEX: 20.34 KG/M2 | WEIGHT: 134.25 LBS | SYSTOLIC BLOOD PRESSURE: 92 MMHG

## 2018-06-21 DIAGNOSIS — Q20.1 DORV (DOUBLE OUTLET RIGHT VENTRICLE): ICD-10-CM

## 2018-06-21 DIAGNOSIS — Q20.1 DOUBLE OUTLET RIGHT VENTRICLE: Primary | ICD-10-CM

## 2018-06-21 DIAGNOSIS — Q24.9 ADULT CONGENITAL HEART DISEASE: ICD-10-CM

## 2018-06-21 DIAGNOSIS — Q20.1 DOUBLE OUTLET RIGHT VENTRICLE: ICD-10-CM

## 2018-06-21 DIAGNOSIS — I37.0 NONRHEUMATIC PULMONARY VALVE STENOSIS: ICD-10-CM

## 2018-06-21 DIAGNOSIS — Z95.2 PULMONARY VALVE REPLACED: ICD-10-CM

## 2018-06-21 DIAGNOSIS — Z98.890 PERSONAL HISTORY OF SURGERY TO HEART AND GREAT VESSELS, PRESENTING HAZARDS TO HEALTH: ICD-10-CM

## 2018-06-21 PROCEDURE — 93303 ECHO TRANSTHORACIC: CPT | Mod: S$GLB,,, | Performed by: PEDIATRICS

## 2018-06-21 PROCEDURE — 93000 ELECTROCARDIOGRAM COMPLETE: CPT | Mod: S$GLB,,, | Performed by: INTERNAL MEDICINE

## 2018-06-21 PROCEDURE — 99999 PR PBB SHADOW E&M-EST. PATIENT-LVL III: CPT | Mod: PBBFAC,,, | Performed by: PEDIATRICS

## 2018-06-21 PROCEDURE — 93320 DOPPLER ECHO COMPLETE: CPT | Mod: S$GLB,,, | Performed by: PEDIATRICS

## 2018-06-21 PROCEDURE — 99214 OFFICE O/P EST MOD 30 MIN: CPT | Mod: 25,S$GLB,, | Performed by: PEDIATRICS

## 2018-06-21 PROCEDURE — 93325 DOPPLER ECHO COLOR FLOW MAPG: CPT | Mod: S$GLB,,, | Performed by: PEDIATRICS

## 2018-06-22 LAB
AORTIC VALVE REGURGITATION: ABNORMAL
DIASTOLIC DYSFUNCTION: NO
ESTIMATED PA SYSTOLIC PRESSURE: 53
MITRAL VALVE REGURGITATION: ABNORMAL
RETIRED EF AND QEF - SEE NOTES: 55 (ref 55–65)
TRICUSPID VALVE REGURGITATION: ABNORMAL

## 2018-06-22 NOTE — PROGRESS NOTES
2018    re:Samantha Lopez  :1985    Yessy Fernandez MD  [address]  [facility] [address]    [facility]     Samantha Lopez is a 32 y.o. 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  Diagnoses:  1.  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  2.  Status post right ventricle to pulmonary artery conduit replacement in  with a 22 mm porcine valved conduit.  3.  Currently with mild to at worst moderate pulmonary stenosis with estimated right ventricular pressures less than half systemic and likely mild pulmonary insufficiency.  There is excellent right ventricular function and at worst mild right ventricular enlargement.  4.  Very reassuring CPX testing and cardiac MRI 2016 prior to first pregnancy.  5.  Mild dilation of the aortic root with mild aortic insufficiency.  This is typical with her disease and needs to be followed long-term.  6.  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  7.  Now 10 weeks pregnant    Discussion:  She has very well repaired congenital heart disease.  In the future, she will require replacement of her pulmonary valve, but I hope that this can be accomplished in the catheterization laboratory.  We had planned a repeat MRI and CPX, but she is now pregnant again.  I expect her to tolerate this pregnancy well, but we will need to follow her closely.      Plan:  1.  Continue baby aspirin for now.  2.  Endocarditis prophylaxis is recommended prior to dental work.  3.  Continue regular exercise and healthy diet.  4.  Return to clinic in about 3 months with repeat echo and ekg.  5.  Fetal echo in [place] (information  sent to that group)    Interval history:  She delivered a healthy baby boy, Duke, 11 weeks ago.  She did well without pregnancy and delivery.  The baby is doing extremely well at home.  She feels great.  She denies chest pain, palpitations, syncope, near-syncope, cyanosis, and edema.    PMH:  She underwent a right sided BT shunt at 2 months of age followed by a Rastelli operation at 3 years of age, both performed by Dr. Deleon in [facility].  She subsequently underwent replacement of the right ventricle to pulmonary artery conduit with a 22 mm porcine valve conduit on June 7, 2007 in [place], also by Dr. Deleon.  Until a few years ago, she was followed at [facility] by Dr. Olivares.    The review of systems is as noted above. It is otherwise negative for other symptoms related to the general, neurological, psychiatric, endocrine, gastrointestinal, genitourinary, respiratory, dermatologic, musculoskeletal, hematologic, and immunologic systems.    Past Medical History:   Diagnosis Date    Double outlet right ventricle     Migraine headache      Past Surgical History:   Procedure Laterality Date    ROSENDO PROCEDURE      BT shunt in [facility] by Dr. Deleon at 2 months of age    NASAL SEPTUM SURGERY      RASTELLI PROCEDURE      in [facility] by Dr. Deleon at 3 years of age    RV to PA conduit  06/07/2007    at [place] by Dr. Deleon - 22mm porcine valved conduit     Family History   Problem Relation Age of Onset    Breast cancer Maternal Aunt 40        bilateral mastectomy    Stroke Maternal Grandmother     Stroke Maternal Grandfather     Colon cancer Neg Hx     Diabetes Neg Hx     Hypertension Neg Hx     Ovarian cancer Neg Hx     Congenital heart disease Neg Hx      Social History     Social History    Marital status:      Spouse name: N/A    Number of children: N/A    Years of education: N/A     Social History Main Topics    Smoking status: Never Smoker    Smokeless  "tobacco: Never Used    Alcohol use Yes      Comment: alcohol use socially    Drug use: No    Sexual activity: Yes     Partners: Male     Birth control/ protection: OCP     Other Topics Concern    None     Social History Narrative    She has a PhD from [employer].  She is  and lives in [place].  Her  is an internist at [employer].  She also works at [employer].     Current Outpatient Prescriptions on File Prior to Visit   Medication Sig Dispense Refill    aspirin 81 MG Chew Take 1 tablet (81 mg total) by mouth once daily.  0    PNV 11-iron fum-folic acid-om3 (VIVA DHA) 28-1-200 mg Cap Take 1 capsule by mouth once daily. 30 each 12    [DISCONTINUED] ibuprofen (ADVIL,MOTRIN) 600 MG tablet Take 1 tablet (600 mg total) by mouth every 6 (six) hours as needed. 30 tablet 3     No current facility-administered medications on file prior to visit.      Review of patient's allergies indicates:  No Known Allergies    BP 92/70 (BP Location: Left arm, Patient Position: Sitting, BP Method: Large (Automatic))   Pulse 76   Ht 5' 8" (1.727 m)   Wt 60.9 kg (134 lb 4.2 oz)   SpO2 98%   BMI 20.41 kg/m²   In general, she is a very healthy-appearing nondysmorphic female in no apparent distress.  The eyes, nares, and oropharynx are clear.  Eyelids and conjunctiva are normal without drainage or erythema.  Pupils equal and round bilaterally.  The head is normocephalic and atraumatic.  The neck is supple without jugular venous distention or thyroid enlargement.  The lungs are clear to auscultation bilaterally.  There is a well-healed right thoracotomy and median sternotomy scar.  The first heart sound is normal.  The second heart sound is fixed and split.  There is a grade 3/6 systolic ejection murmur heard best at the upper left sternal border with radiation to the lung fields bilaterally.  There is a 1/6 short diastolic murmur as well.  The abdominal exam is benign without " hepatosplenomegaly, tenderness, or distention.  Pulses are normal in all 4 extremities with brisk capillary refill and no clubbing, cyanosis, or edema.  No rashes are noted.    I personally reviewed the following tests performed today and my interpretation follows:  EKG reveals normal sinus rhythm with an incomplete right bundle branch block pattern.      Her echocardiogram today reveals (my interpretation, official report pending):  History of DORV with malposed great vessels and PS S/P Rastelli:  Technically challenging study  - RV to PA conduit not well visualized  Mild  tricuspid insufficiency at velocity suggesting right ventricular pressure as high as 58mmHg.   Mildly dilated right ventricle with qualitatively good right ventricular systolic  function.  No evidence of residual left to right shunt at intracardiac baffle  Left ventricle is normal in size  Normal LV function.    Mild aortic insufficiency arising at central jet.   Mildly dilated aortic root - 3.5cm maximum.    She had CPX testing February 2016.  I reviewed that study.  There were no arrhythmias.  There was no desaturation.  Her exercise tolerance was excellent:  4) The PkVO2 was 40.8 ml/kg/min which is 112% of predicted equating to a functional capacity of 11.7 METS indicating normal functional status.     I also reviewed a cardiac MRI performed February 16, 2016 in [place].  Excellent biventricular function is noted.  There is no significant enlargement of the right ventricle.  Measurements actually suggest a right ventricular volume less than the left ventricular volume, although I do not believe this.  Unrestricted pulmonary valve insufficiency is noted.  The branch pulmonary arteries look good.  Very mild dilation of the aortic root is noted with a diameter less than 4 cm.    Thank you for referring this patient to our clinic.  Please call with any questions.    Sincerely,        Be Levine MD  Pediatric Cardiology  Adult Congenital  Heart Disease  Pediatric Heart Failure and Transplantation  [facility]  [address] [place]  [address]  [phone]

## 2018-07-17 ENCOUNTER — PATIENT MESSAGE (OUTPATIENT)
Dept: CARDIOLOGY | Facility: CLINIC | Age: 33
End: 2018-07-17

## 2018-08-23 ENCOUNTER — PATIENT MESSAGE (OUTPATIENT)
Dept: CARDIOLOGY | Facility: CLINIC | Age: 33
End: 2018-08-23

## 2018-08-23 DIAGNOSIS — Q20.3 DORV, TGA TYPE (DOUBLE-OUTLET RV, TRANSPOSITION OF THE GREAT ARTERIES): ICD-10-CM

## 2018-08-23 DIAGNOSIS — O35.BXX0 FETAL DOUBLE OUTLET RIGHT VENTRICLE AFFECTING CARE OF MOTHER, ANTEPARTUM, SINGLE OR UNSPECIFIED FETUS: Primary | ICD-10-CM

## 2018-08-23 DIAGNOSIS — Q20.1 DORV, TGA TYPE (DOUBLE-OUTLET RV, TRANSPOSITION OF THE GREAT ARTERIES): ICD-10-CM

## 2018-08-23 DIAGNOSIS — Q21.0 VSD (VENTRICULAR SEPTAL DEFECT): ICD-10-CM

## 2018-08-23 DIAGNOSIS — Z87.74 STATUS POST SURGERY FOR COMPLEX CONGENITAL HEART DISEASE: ICD-10-CM

## 2018-09-20 ENCOUNTER — OFFICE VISIT (OUTPATIENT)
Dept: CARDIOLOGY | Facility: CLINIC | Age: 33
End: 2018-09-20
Payer: COMMERCIAL

## 2018-09-20 ENCOUNTER — HOSPITAL ENCOUNTER (OUTPATIENT)
Dept: CARDIOLOGY | Facility: CLINIC | Age: 33
Discharge: HOME | End: 2018-09-20
Payer: COMMERCIAL

## 2018-09-20 VITALS
WEIGHT: 146.81 LBS | HEART RATE: 80 BPM | SYSTOLIC BLOOD PRESSURE: 97 MMHG | OXYGEN SATURATION: 98 % | DIASTOLIC BLOOD PRESSURE: 60 MMHG | BODY MASS INDEX: 22.25 KG/M2 | HEIGHT: 68 IN

## 2018-09-20 DIAGNOSIS — Z87.74 STATUS POST SURGERY FOR COMPLEX CONGENITAL HEART DISEASE: ICD-10-CM

## 2018-09-20 DIAGNOSIS — Q20.3 DORV, TGA TYPE (DOUBLE-OUTLET RV, TRANSPOSITION OF THE GREAT ARTERIES): ICD-10-CM

## 2018-09-20 DIAGNOSIS — Q20.1 DORV, TGA TYPE (DOUBLE-OUTLET RV, TRANSPOSITION OF THE GREAT ARTERIES): ICD-10-CM

## 2018-09-20 DIAGNOSIS — Q21.0 VSD (VENTRICULAR SEPTAL DEFECT): ICD-10-CM

## 2018-09-20 DIAGNOSIS — I37.0 NONRHEUMATIC PULMONARY VALVE STENOSIS: ICD-10-CM

## 2018-09-20 DIAGNOSIS — Q24.9 ADULT CONGENITAL HEART DISEASE: Primary | ICD-10-CM

## 2018-09-20 DIAGNOSIS — I51.9 HEART DISEASE IN MOTHER AFFECTING PREGNANCY IN SECOND TRIMESTER: ICD-10-CM

## 2018-09-20 DIAGNOSIS — O35.BXX0 FETAL DOUBLE OUTLET RIGHT VENTRICLE AFFECTING CARE OF MOTHER, ANTEPARTUM, SINGLE OR UNSPECIFIED FETUS: ICD-10-CM

## 2018-09-20 DIAGNOSIS — Q20.1 DOUBLE OUTLET RIGHT VENTRICLE: ICD-10-CM

## 2018-09-20 DIAGNOSIS — Z95.2 PULMONARY VALVE REPLACED: ICD-10-CM

## 2018-09-20 DIAGNOSIS — Z98.890 PERSONAL HISTORY OF SURGERY TO HEART AND GREAT VESSELS, PRESENTING HAZARDS TO HEALTH: ICD-10-CM

## 2018-09-20 DIAGNOSIS — O99.412 HEART DISEASE IN MOTHER AFFECTING PREGNANCY IN SECOND TRIMESTER: ICD-10-CM

## 2018-09-20 PROCEDURE — 99214 OFFICE O/P EST MOD 30 MIN: CPT | Mod: 25,S$GLB,, | Performed by: PEDIATRICS

## 2018-09-20 PROCEDURE — 93000 ELECTROCARDIOGRAM COMPLETE: CPT | Mod: S$GLB,,, | Performed by: INTERNAL MEDICINE

## 2018-09-20 PROCEDURE — 93325 DOPPLER ECHO COLOR FLOW MAPG: CPT | Mod: S$GLB,,, | Performed by: INTERNAL MEDICINE

## 2018-09-20 PROCEDURE — 93320 DOPPLER ECHO COMPLETE: CPT | Mod: S$GLB,,, | Performed by: INTERNAL MEDICINE

## 2018-09-20 PROCEDURE — 93303 ECHO TRANSTHORACIC: CPT | Mod: S$GLB,,, | Performed by: INTERNAL MEDICINE

## 2018-09-20 PROCEDURE — 99999 PR PBB SHADOW E&M-EST. PATIENT-LVL III: CPT | Mod: PBBFAC,,, | Performed by: PEDIATRICS

## 2018-09-21 NOTE — PROGRESS NOTES
2018    re:Samantha Lopez  :1985    Emily Us MD  [address] [facility] [place] [address]    [facility]     Samantha Lopez is a 33 y.o. 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  Diagnoses:  1.  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  2.  Status post right ventricle to pulmonary artery conduit replacement in  with a 22 mm porcine valved conduit.  3.  Currently with mild to at worst moderate pulmonary stenosis with estimated right ventricular pressures less than half systemic and likely mild pulmonary insufficiency.  There is excellent right ventricular function and at worst mild right ventricular enlargement.  4.  Very reassuring CPX testing and cardiac MRI 2016 prior to first pregnancy.  5.  Mild dilation of the aortic root with mild aortic insufficiency.  This is typical with her disease and needs to be followed long-term.  6.  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  7.  Now about 28 weeks pregnant.  Some concerns by patient report about VSD on fetal.    Discussion:  She has very well repaired congenital heart disease.  In the future, she will require replacement of her pulmonary valve, but I hope that this can be accomplished in the catheterization laboratory.  We had planned a repeat MRI and CPX, but she is now pregnant again.  I expect her to tolerate this pregnancy well, but we will need to follow her closely.  There is no cardiac contraindication to vaginal delivery.    Plan:  1.  Continue baby aspirin for now but OK to stop for delivery if needed by OB.  2.  Endocarditis  prophylaxis is recommended.  3.  Continue regular exercise and healthy diet.  4.  Return to clinic in about 8 weeks with repeat echo and ekg.  5.  Baby can be seen by Dr. Prado's team in [facility] history:  She has had no new issues since I last saw her 3 months ago.  She denies any significant palpitations.  She has had no shortness of breath or syncope.  She denies chest pain.  She has had no edema. She is tired, but she attributes that to her pregnancy and caring for her 1 and a half year old son.  She had her fetal echo performed by Dr. Prado.  Initially, there was concern about a possible ventricular septal defect.  By her report, a follow-up study looked normal.    PMH:  She underwent a right sided BT shunt at 2 months of age followed by a Rastelli operation at 3 years of age, both performed by Dr. Deleon in [facility].  She subsequently underwent replacement of the right ventricle to pulmonary artery conduit with a 22 mm porcine valve conduit on June 7, 2007 in [place], also by Dr. Deleon.  Until a few years ago, she was followed at [place] by Dr. Olivares.    The review of systems is as noted above. It is otherwise negative for other symptoms related to the general, neurological, psychiatric, endocrine, gastrointestinal, genitourinary, respiratory, dermatologic, musculoskeletal, hematologic, and immunologic systems.    Past Medical History:   Diagnosis Date    Double outlet right ventricle     Migraine headache      Past Surgical History:   Procedure Laterality Date    ROSENDO PROCEDURE      BT shunt in [facility] by Dr. Deleon at 2 months of age    NASAL SEPTUM SURGERY      RASTELLI PROCEDURE      in [facility] by Dr. Deleon at 3 years of age    RV to PA conduit  06/07/2007    at [place] by Dr. Deleon - 22mm porcine valved conduit     Family History   Problem Relation Age of Onset    Breast cancer Maternal Aunt 40        bilateral mastectomy    Stroke  "Maternal Grandmother     Stroke Maternal Grandfather     Colon cancer Neg Hx     Diabetes Neg Hx     Hypertension Neg Hx     Ovarian cancer Neg Hx     Congenital heart disease Neg Hx      Social History     Socioeconomic History    Marital status:      Spouse name: None    Number of children: None    Years of education: None    Highest education level: None   Social Needs    Financial resource strain: None    Food insecurity - worry: None    Food insecurity - inability: None    Transportation needs - medical: None    Transportation needs - non-medical: None   Occupational History    None   Tobacco Use    Smoking status: Never Smoker    Smokeless tobacco: Never Used   Substance and Sexual Activity    Alcohol use: No     Frequency: Never    Drug use: No    Sexual activity: Yes     Partners: Male     Birth control/protection: OCP   Other Topics Concern    None   Social History Narrative    She has a PhD from [employer].  She is  and lives in [place].  Her  is an internist at [facility] [employer].  She also works at [employer].     Current Outpatient Medications on File Prior to Visit   Medication Sig Dispense Refill    aspirin 81 MG Chew Take 1 tablet (81 mg total) by mouth once daily.  0    PNV 11-iron fum-folic acid-om3 (VIVA DHA) 28-1-200 mg Cap Take 1 capsule by mouth once daily. 30 each 12     No current facility-administered medications on file prior to visit.      Review of patient's allergies indicates:  No Known Allergies    BP 97/60 (BP Location: Right arm, Patient Position: Sitting, BP Method: Large (Automatic))   Pulse 80   Ht 5' 8" (1.727 m)   Wt 66.6 kg (146 lb 13.2 oz)   LMP 04/20/2018   SpO2 98%   BMI 22.32 kg/m²   In general, she is a very healthy-appearing nondysmorphic female in no apparent distress.  The eyes, nares, and oropharynx are clear.  Eyelids and conjunctiva are normal without drainage or erythema.  Pupils equal and round " bilaterally.  The head is normocephalic and atraumatic.  The neck is supple without jugular venous distention or thyroid enlargement.  The lungs are clear to auscultation bilaterally.  There is a well-healed right thoracotomy and median sternotomy scar.  The first heart sound is normal.  The second heart sound is fixed and split.  There is a grade 3/6 systolic ejection murmur heard best at the upper left sternal border with radiation to the lung fields bilaterally.  There is a 1/6 short diastolic murmur as well.  The abdominal exam is benign without hepatosplenomegaly, tenderness.  She has a gravid uterus.  Pulses are normal in all 4 extremities with brisk capillary refill and no clubbing, cyanosis, or edema.  No rashes are noted.    I personally reviewed the following tests performed today and my interpretation follows:  EKG reveals normal sinus rhythm with an incomplete right bundle branch block pattern - unchanged.      Her echocardiogram today reveals (my interpretation, official report pending):  History of DORV with malposed great vessels and PS S/P Rastelli:  Technically challenging study  - RV to PA conduit not well visualized  Mild  tricuspid insufficiency at velocity suggesting right ventricular pressure as high as 58mmHg.   Mildly dilated right ventricle with qualitatively good right ventricular systolic  function.  No evidence of residual left to right shunt at intracardiac baffle  Left ventricle is normal in size  Normal LV function.    Trivial to mild aortic insufficiency arising at central jet.   Mildly dilated aortic root.    She had CPX testing February 2016.  I reviewed that study.  There were no arrhythmias.  There was no desaturation.  Her exercise tolerance was excellent:  4) The PkVO2 was 40.8 ml/kg/min which is 112% of predicted equating to a functional capacity of 11.7 METS indicating normal functional status.     I also reviewed a cardiac MRI performed February 16, 2016 in [place].   Excellent biventricular function is noted.  There is no significant enlargement of the right ventricle.  Measurements actually suggest a right ventricular volume less than the left ventricular volume, although I do not believe this.  Unrestricted pulmonary valve insufficiency is noted.  The branch pulmonary arteries look good.  Very mild dilation of the aortic root is noted with a diameter less than 4 cm.    Thank you for referring this patient to our clinic.  Please call with any questions.    Sincerely,        Be Levine MD  Pediatric Cardiology  Adult Congenital Heart Disease  Pediatric Heart Failure and Transplantation  [facility] [address] [place]  [address]  [phone]

## 2018-09-22 LAB
AORTIC VALVE REGURGITATION: ABNORMAL
DIASTOLIC DYSFUNCTION: NO
ESTIMATED PA SYSTOLIC PRESSURE: 62.76
MITRAL VALVE REGURGITATION: ABNORMAL
RETIRED EF AND QEF - SEE NOTES: 65 (ref 55–65)
TRICUSPID VALVE REGURGITATION: ABNORMAL

## 2018-11-13 ENCOUNTER — PATIENT MESSAGE (OUTPATIENT)
Dept: CARDIOLOGY | Facility: CLINIC | Age: 33
End: 2018-11-13

## 2018-11-28 ENCOUNTER — HOSPITAL ENCOUNTER (OUTPATIENT)
Dept: CARDIOLOGY | Facility: CLINIC | Age: 33
Discharge: HOME | End: 2018-11-28
Attending: PEDIATRICS
Payer: COMMERCIAL

## 2018-11-28 ENCOUNTER — OFFICE VISIT (OUTPATIENT)
Dept: CARDIOLOGY | Facility: CLINIC | Age: 33
End: 2018-11-28
Payer: COMMERCIAL

## 2018-11-28 ENCOUNTER — HOSPITAL ENCOUNTER (OUTPATIENT)
Dept: CARDIOLOGY | Facility: CLINIC | Age: 33
Discharge: HOME | End: 2018-11-28
Payer: COMMERCIAL

## 2018-11-28 VITALS
WEIGHT: 158.94 LBS | SYSTOLIC BLOOD PRESSURE: 105 MMHG | OXYGEN SATURATION: 96 % | HEIGHT: 68 IN | BODY MASS INDEX: 24.09 KG/M2 | HEART RATE: 88 BPM | DIASTOLIC BLOOD PRESSURE: 69 MMHG

## 2018-11-28 DIAGNOSIS — Q24.9 ADULT CONGENITAL HEART DISEASE: ICD-10-CM

## 2018-11-28 DIAGNOSIS — Q20.1 DOUBLE OUTLET RIGHT VENTRICLE: Primary | ICD-10-CM

## 2018-11-28 DIAGNOSIS — Z98.890 PERSONAL HISTORY OF SURGERY TO HEART AND GREAT VESSELS, PRESENTING HAZARDS TO HEALTH: ICD-10-CM

## 2018-11-28 DIAGNOSIS — Q20.1 DOUBLE OUTLET RIGHT VENTRICLE: ICD-10-CM

## 2018-11-28 DIAGNOSIS — I37.0 NONRHEUMATIC PULMONARY VALVE STENOSIS: ICD-10-CM

## 2018-11-28 PROBLEM — O99.412 HEART DISEASE IN MOTHER AFFECTING PREGNANCY IN SECOND TRIMESTER: Status: RESOLVED | Noted: 2018-09-20 | Resolved: 2018-11-28

## 2018-11-28 PROBLEM — I51.9 HEART DISEASE IN MOTHER AFFECTING PREGNANCY IN SECOND TRIMESTER: Status: RESOLVED | Noted: 2018-09-20 | Resolved: 2018-11-28

## 2018-11-28 LAB
ASCENDING AORTA: 3.22 CM
AV INDEX (PROSTH): 0.95
AV MEAN GRADIENT: 8.66 MMHG
AV PEAK GRADIENT: 14.14 MMHG
AV VALVE AREA: 4.57 CM2
CV ECHO LV RWT: 0.42 CM
DOP CALC AO PEAK VEL: 1.88 M/S
DOP CALC AO VTI: 32.44 CM
DOP CALC LVOT AREA: 4.83 CM2
DOP CALC LVOT DIAMETER: 2.48 CM
DOP CALC LVOT STROKE VOLUME: 148.32 CM3
DOP CALCLVOT PEAK VEL VTI: 30.72 CM
E/E' RATIO: 5.67
ECHO LV POSTERIOR WALL: 0.94 CM (ref 0.6–1.1)
FRACTIONAL SHORTENING: 53 % (ref 28–44)
INTERVENTRICULAR SEPTUM: 0.96 CM (ref 0.6–1.1)
LA MAJOR: 4.38 CM
LA MINOR: 3.93 CM
LA WIDTH: 3.55 CM
LEFT ATRIUM SIZE: 3.13 CM
LEFT ATRIUM VOLUME: 39.13 CM3
LEFT INTERNAL DIMENSION IN SYSTOLE: 2.07 CM (ref 2.1–4)
LEFT VENTRICLE DIASTOLIC VOLUME: 89.66 ML
LEFT VENTRICLE SYSTOLIC VOLUME: 13.89 ML
LEFT VENTRICULAR INTERNAL DIMENSION IN DIASTOLE: 4.44 CM (ref 3.5–6)
LEFT VENTRICULAR MASS: 139.8 G
LV LATERAL E/E' RATIO: 5.1
LV SEPTAL E/E' RATIO: 6.38
MV PEAK E VEL: 1.02 M/S
PISA TR MAX VEL: 4.3 M/S
PV PEAK VELOCITY: 2.82 CM/S
RA MAJOR: 4.84 CM
RA WIDTH: 4.08 CM
RIGHT VENTRICULAR END-DIASTOLIC DIMENSION: 5.25 CM
RV TISSUE DOPPLER FREE WALL SYSTOLIC VELOCITY 1 (APICAL 4 CHAMBER VIEW): 7.38 M/S
SINUS: 3.62 CM
STJ: 3.33 CM
TDI LATERAL: 0.2
TDI SEPTAL: 0.16
TDI: 0.18
TR MAX PG: 73.96 MMHG
TRICUSPID ANNULAR PLANE SYSTOLIC EXCURSION: 1.47 CM

## 2018-11-28 PROCEDURE — 99214 OFFICE O/P EST MOD 30 MIN: CPT | Mod: 25,S$GLB,, | Performed by: PEDIATRICS

## 2018-11-28 PROCEDURE — 99999 PR PBB SHADOW E&M-EST. PATIENT-LVL III: CPT | Mod: PBBFAC,,, | Performed by: PEDIATRICS

## 2018-11-28 PROCEDURE — 93000 ELECTROCARDIOGRAM COMPLETE: CPT | Mod: S$GLB,,, | Performed by: INTERNAL MEDICINE

## 2018-11-28 PROCEDURE — 93306 TTE W/DOPPLER COMPLETE: CPT | Mod: S$GLB,,, | Performed by: PEDIATRICS

## 2018-11-28 RX ORDER — SUMATRIPTAN 50 MG/1
50 TABLET, FILM COATED ORAL
COMMUNITY
Start: 2016-08-11 | End: 2022-03-10

## 2018-11-28 RX ORDER — ASCORBIC ACID, CHOLECALCIFEROL, .ALPHA.-TOCOPHEROL ACETATE, DL-, PYRIDOXINE HYDROCHLORIDE, FOLIC ACID, CYANOCOBALAMIN, BIOTIN, CALCIUM CARBONATE, FERROUS ASPARTO GLYCINATE, IRON, POTASSIUM IODIDE, MAGNESIUM OXIDE, DOCONEXENT AND LOWBUSH BLUEBERRY 60; 1000; 10; 26; 400; 13; 280; 80; 9; 9; 150; 25; 350; 25; 600 MG/1; [IU]/1; [IU]/1; MG/1; UG/1; UG/1; UG/1; MG/1; MG/1; MG/1; UG/1; MG/1; MG/1; MG/1; UG/1
CAPSULE, GELATIN COATED ORAL
Refills: 9 | COMMUNITY
Start: 2018-10-31 | End: 2022-03-10

## 2018-11-28 NOTE — PROGRESS NOTES
2018    re:Samantha Lopez  :1985    Emily Us MD  [address] [facility] [place] [address]    [facility]     Samantha Lopez is a 33 y.o. 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  Diagnoses:  1.  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  2.  Status post right ventricle to pulmonary artery conduit replacement in  with a 22 mm porcine valved conduit.  3.  Currently with mild to at worst moderate pulmonary stenosis with estimated right ventricular pressures less than half systemic and likely moderate pulmonary insufficiency.  There is excellent right ventricular function and at worst mild right ventricular enlargement.  4.  Very reassuring CPX testing and cardiac MRI 2016 prior to first pregnancy.  5.  Mild dilation of the aortic root with mild aortic insufficiency.  This is typical with her disease and needs to be followed long-term.  6.  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  7.  Now about 33 weeks pregnant.   8.  At risk for arrhythmia, likely with PACs or PVCs at present.    Discussion:  She has very well repaired congenital heart disease.  In the future, she will require replacement of her pulmonary valve, but I hope that this can be accomplished in the catheterization laboratory.  We had planned a repeat MRI and CPX, but she is now pregnant again.  I expect her to tolerate this pregnancy well, but we will need to follow her closely.  There is no cardiac contraindication to vaginal delivery.  I really don't think that her shortness of breath is cardiac in origin.  She has rare  palpitations that sound like PVCs or PACs.  If these became more frequent, I would definitely want to get a holter.  My major concern is the risk for arrhythmia.    Plan:  1.  Fine to hold aspirin for now.  2.  Endocarditis prophylaxis is recommended.  Given the risk of endocarditis with her bioprosthetic pulmonary valve, I WOULD recommend prophylaxis even with a vaginal delivery.  3.  She is at an increased risk of arrhythmia.  I would recommend telemetry monitoring when she is admitted for delivery and until she is discharged.  4.  Return to clinic in about 6 weeks after delivery with repeat echo, holter and ekg.  Will plan MRI and CPX about 6 months after delivery.    Interval history:  A few weeks ago, she started having some shortness of breath.  This has not progressed at all.  She notices it more when she is eating and trying to talk.  She denies syncope, near syncope, and chest pain.  She has had no significant edema.  She has occasional brief palpitations.  These occur about 2 times per week.  She describes a single skipped beat followed by a hard beat.  No runs of palpitations.  She has had these for years, but they may be a little more frequent during this pregnancy.    PMH:  She underwent a right sided BT shunt at 2 months of age followed by a Rastelli operation at 3 years of age, both performed by Dr. Deleon in [facility].  She subsequently underwent replacement of the right ventricle to pulmonary artery conduit with a 22 mm porcine valve conduit on June 7, 2007 in [place], also by Dr. Deleon.  Until a few years ago, she was followed at [place] by Dr. Olivares.    The review of systems is as noted above. It is otherwise negative for other symptoms related to the general, neurological, psychiatric, endocrine, gastrointestinal, genitourinary, respiratory, dermatologic, musculoskeletal, hematologic, and immunologic systems.    Past Medical History:   Diagnosis Date    Double outlet right  ventricle     Migraine headache      Past Surgical History:   Procedure Laterality Date    ROSENDO PROCEDURE      BT shunt in [facility] by Dr. Deleon at 2 months of age    NASAL SEPTUM SURGERY      RASTELLI PROCEDURE      in [facility] by Dr. Deleon at 3 years of age    RV to PA conduit  06/07/2007    at [place] by Dr. Deleon - 22mm porcine valved conduit     Family History   Problem Relation Age of Onset    Breast cancer Maternal Aunt 40        bilateral mastectomy    Stroke Maternal Grandmother     Stroke Maternal Grandfather     Colon cancer Neg Hx     Diabetes Neg Hx     Hypertension Neg Hx     Ovarian cancer Neg Hx     Congenital heart disease Neg Hx      Social History     Socioeconomic History    Marital status:      Spouse name: None    Number of children: None    Years of education: None    Highest education level: None   Social Needs    Financial resource strain: None    Food insecurity - worry: None    Food insecurity - inability: None    Transportation needs - medical: None    Transportation needs - non-medical: None   Occupational History    None   Tobacco Use    Smoking status: Never Smoker    Smokeless tobacco: Never Used   Substance and Sexual Activity    Alcohol use: No     Frequency: Never    Drug use: No    Sexual activity: Yes     Partners: Male     Birth control/protection: OCP   Other Topics Concern    None   Social History Narrative    She has a PhD from [employer].  She is  and lives in [place].  Her  is an internist at [employer].  She also works at [employer].     Current Outpatient Medications on File Prior to Visit   Medication Sig Dispense Refill    PRENATE MINI, FERR ASP GLYCIN, 18-1-350 mg Cap TK 1 C PO QD  9    aspirin 81 MG Chew Take 1 tablet (81 mg total) by mouth once daily.  0    PNV 11-iron fum-folic acid-om3 (VIVA DHA) 28-1-200 mg Cap Take 1 capsule by mouth once daily. 30 each 12     "sumatriptan (IMITREX) 50 MG tablet Take 50 mg by mouth every 2 (two) hours as needed.       No current facility-administered medications on file prior to visit.      Review of patient's allergies indicates:  No Known Allergies    /69 (BP Location: Left arm, Patient Position: Sitting, BP Method: Large (Automatic))   Pulse 88   Ht 5' 8" (1.727 m)   Wt 72.1 kg (158 lb 15.2 oz)   SpO2 96%   BMI 24.17 kg/m²   In general, she is a very healthy-appearing nondysmorphic female in no apparent distress.  The eyes, nares, and oropharynx are clear.  Eyelids and conjunctiva are normal without drainage or erythema.  Pupils equal and round bilaterally.  The head is normocephalic and atraumatic.  The neck is supple without jugular venous distention or thyroid enlargement.  The lungs are clear to auscultation bilaterally.  There is a well-healed right thoracotomy and median sternotomy scar.  The first heart sound is normal.  The second heart sound is fixed and split.  There is a grade 3/6 systolic ejection murmur heard best at the upper left sternal border with radiation to the lung fields bilaterally.  There is a 1/6 short diastolic murmur as well.  The abdominal exam is benign without hepatosplenomegaly, tenderness.  She has a gravid uterus.  Pulses are normal in all 4 extremities with brisk capillary refill and no clubbing, cyanosis, or edema.  No rashes are noted.    I personally reviewed the following tests performed today and my interpretation follows:  EKG reveals normal sinus rhythm with an incomplete right bundle branch block pattern - unchanged.      Her echocardiogram today reveals (my interpretation, official report pending):  History of DORV with malposed great vessels and PS S/P [place]:  Technically challenging study  - about 3 m/s across the pulmonary valve with likely moderate insufficiency  Mild  tricuspid insufficiency at velocity suggesting right ventricular pressure as high as 60mmHg.   Mildly dilated " right ventricle with qualitatively good right ventricular systolic  function.  No evidence of residual left to right shunt at intracardiac baffle  Left ventricle is normal in size  Normal LV function.    Trivial to mild aortic insufficiency arising at central jet.   Mildly dilated aortic root.    She had CPX testing February 2016.  I reviewed that study.  There were no arrhythmias.  There was no desaturation.  Her exercise tolerance was excellent:  4) The PkVO2 was 40.8 ml/kg/min which is 112% of predicted equating to a functional capacity of 11.7 METS indicating normal functional status.     I also reviewed a cardiac MRI performed February 16, 2016 in [place].  Excellent biventricular function is noted.  There is no significant enlargement of the right ventricle.  Measurements actually suggest a right ventricular volume less than the left ventricular volume, although I do not believe this.  Unrestricted pulmonary valve insufficiency is noted.  The branch pulmonary arteries look good.  Very mild dilation of the aortic root is noted with a diameter less than 4 cm.    Thank you for referring this patient to our clinic.  Please call with any questions.    Sincerely,        Be Levine MD  Pediatric Cardiology  Adult Congenital Heart Disease  Pediatric Heart Failure and Transplantation  [facility] [address] [place]  [address]  [phone]

## 2018-12-05 ENCOUNTER — PATIENT MESSAGE (OUTPATIENT)
Dept: CARDIOLOGY | Facility: CLINIC | Age: 33
End: 2018-12-05

## 2018-12-18 ENCOUNTER — PATIENT MESSAGE (OUTPATIENT)
Dept: PEDIATRIC CARDIOLOGY | Facility: CLINIC | Age: 33
End: 2018-12-18

## 2019-01-14 ENCOUNTER — PATIENT MESSAGE (OUTPATIENT)
Dept: PEDIATRIC CARDIOLOGY | Facility: CLINIC | Age: 34
End: 2019-01-14

## 2019-06-04 DIAGNOSIS — Q24.9 CONGENITAL HEART DISEASE: ICD-10-CM

## 2019-06-24 DIAGNOSIS — Q24.9 ADULT CONGENITAL HEART DISEASE: ICD-10-CM

## 2019-06-24 DIAGNOSIS — Q20.1 DOUBLE OUTLET RIGHT VENTRICLE: Primary | ICD-10-CM

## 2019-06-24 DIAGNOSIS — Z95.2 PULMONARY VALVE REPLACED: ICD-10-CM

## 2019-06-24 DIAGNOSIS — I37.0 NONRHEUMATIC PULMONARY VALVE STENOSIS: ICD-10-CM

## 2019-06-26 ENCOUNTER — CLINICAL SUPPORT (OUTPATIENT)
Dept: PEDIATRIC CARDIOLOGY | Facility: CLINIC | Age: 34
End: 2019-06-26
Attending: PEDIATRICS
Payer: COMMERCIAL

## 2019-06-26 ENCOUNTER — OFFICE VISIT (OUTPATIENT)
Dept: PEDIATRIC CARDIOLOGY | Facility: CLINIC | Age: 34
End: 2019-06-26
Payer: COMMERCIAL

## 2019-06-26 ENCOUNTER — CLINICAL SUPPORT (OUTPATIENT)
Dept: PEDIATRIC CARDIOLOGY | Facility: CLINIC | Age: 34
End: 2019-06-26
Payer: COMMERCIAL

## 2019-06-26 ENCOUNTER — HOSPITAL ENCOUNTER (OUTPATIENT)
Dept: CARDIOLOGY | Facility: CLINIC | Age: 34
Discharge: HOME | End: 2019-06-26
Payer: COMMERCIAL

## 2019-06-26 ENCOUNTER — HOSPITAL ENCOUNTER (OUTPATIENT)
Dept: CARDIOLOGY | Facility: CLINIC | Age: 34
Discharge: HOME | End: 2019-06-26
Attending: PEDIATRICS
Payer: COMMERCIAL

## 2019-06-26 VITALS — WEIGHT: 158 LBS | BODY MASS INDEX: 23.95 KG/M2 | HEIGHT: 68 IN | HEART RATE: 74 BPM

## 2019-06-26 VITALS
BODY MASS INDEX: 20.43 KG/M2 | DIASTOLIC BLOOD PRESSURE: 65 MMHG | SYSTOLIC BLOOD PRESSURE: 125 MMHG | HEART RATE: 85 BPM | OXYGEN SATURATION: 98 % | WEIGHT: 134.81 LBS | HEIGHT: 68 IN

## 2019-06-26 DIAGNOSIS — I37.0 NONRHEUMATIC PULMONARY VALVE STENOSIS: ICD-10-CM

## 2019-06-26 DIAGNOSIS — Q20.1 DOUBLE OUTLET RIGHT VENTRICLE: Primary | ICD-10-CM

## 2019-06-26 DIAGNOSIS — Z98.890 PERSONAL HISTORY OF SURGERY TO HEART AND GREAT VESSELS, PRESENTING HAZARDS TO HEALTH: ICD-10-CM

## 2019-06-26 DIAGNOSIS — Z95.2 PULMONARY VALVE REPLACED: ICD-10-CM

## 2019-06-26 DIAGNOSIS — Q20.1 DOUBLE OUTLET RIGHT VENTRICLE: ICD-10-CM

## 2019-06-26 DIAGNOSIS — Q24.9 CONGENITAL HEART DISEASE: ICD-10-CM

## 2019-06-26 DIAGNOSIS — Q24.9 ADULT CONGENITAL HEART DISEASE: ICD-10-CM

## 2019-06-26 LAB
ASCENDING AORTA: 3.26 CM
AV INDEX (PROSTH): 0.66
AV MEAN GRADIENT: 3 MMHG
AV PEAK GRADIENT: 5 MMHG
AV VALVE AREA: 2.08 CM2
AV VELOCITY RATIO: 0.58
BSA FOR ECHO PROCEDURE: 1.85 M2
CV ECHO LV RWT: 0.34 CM
DOP CALC AO PEAK VEL: 1.08 M/S
DOP CALC AO VTI: 19.9 CM
DOP CALC LVOT AREA: 3.2 CM2
DOP CALC LVOT DIAMETER: 2.01 CM
DOP CALC LVOT PEAK VEL: 0.63 M/S
DOP CALC LVOT STROKE VOLUME: 41.45 CM3
DOP CALCLVOT PEAK VEL VTI: 13.07 CM
E WAVE DECELERATION TIME: 135.78 MSEC
E/A RATIO: 1.38
E/E' RATIO: 4.13 M/S
ECHO LV POSTERIOR WALL: 0.78 CM (ref 0.6–1.1)
FRACTIONAL SHORTENING: 27 % (ref 28–44)
INTERVENTRICULAR SEPTUM: 0.64 CM (ref 0.6–1.1)
LA MAJOR: 3.98 CM
LA WIDTH: 2.97 CM
LEFT ATRIUM SIZE: 2.86 CM
LEFT INTERNAL DIMENSION IN SYSTOLE: 3.36 CM (ref 2.1–4)
LEFT VENTRICLE DIASTOLIC VOLUME INDEX: 52.51 ML/M2
LEFT VENTRICLE DIASTOLIC VOLUME: 97.09 ML
LEFT VENTRICLE MASS INDEX: 55 G/M2
LEFT VENTRICLE SYSTOLIC VOLUME INDEX: 24.9 ML/M2
LEFT VENTRICLE SYSTOLIC VOLUME: 45.96 ML
LEFT VENTRICULAR INTERNAL DIMENSION IN DIASTOLE: 4.6 CM (ref 3.5–6)
LEFT VENTRICULAR MASS: 101.13 G
LV LATERAL E/E' RATIO: 3.1 M/S
LV SEPTAL E/E' RATIO: 6.2 M/S
MV PEAK A VEL: 0.45 M/S
MV PEAK E VEL: 0.62 M/S
PISA TR MAX VEL: 3.61 M/S
PV PEAK VELOCITY: 2.2 CM/S
RA MAJOR: 4.62 CM
RA WIDTH: 3.74 CM
RIGHT VENTRICULAR END-DIASTOLIC DIMENSION: 4.32 CM
RV TISSUE DOPPLER FREE WALL SYSTOLIC VELOCITY 1 (APICAL 4 CHAMBER VIEW): 6.46 CM/S
SINUS: 3.52 CM
STJ: 3.31 CM
TDI LATERAL: 0.2 M/S
TDI SEPTAL: 0.1 M/S
TDI: 0.2 M/S
TR MAX PG: 52 MMHG
TRICUSPID ANNULAR PLANE SYSTOLIC EXCURSION: 1.1 CM

## 2019-06-26 PROCEDURE — 99999 PR PBB SHADOW E&M-EST. PATIENT-LVL III: ICD-10-PCS | Mod: PBBFAC,,, | Performed by: PEDIATRICS

## 2019-06-26 PROCEDURE — 93005 ELECTROCARDIOGRAM TRACING: CPT | Mod: S$GLB,,, | Performed by: PEDIATRICS

## 2019-06-26 PROCEDURE — 93303 TRANSTHORACIC ECHO (TTE) COMPLETE (CUPID ONLY): ICD-10-PCS | Mod: S$GLB,,, | Performed by: PEDIATRICS

## 2019-06-26 PROCEDURE — 99214 PR OFFICE/OUTPT VISIT, EST, LEVL IV, 30-39 MIN: ICD-10-PCS | Mod: S$GLB,,, | Performed by: PEDIATRICS

## 2019-06-26 PROCEDURE — 93010 ELECTROCARDIOGRAM REPORT: CPT | Mod: S$GLB,,, | Performed by: INTERNAL MEDICINE

## 2019-06-26 PROCEDURE — 99999 PR PBB SHADOW E&M-EST. PATIENT-LVL III: CPT | Mod: PBBFAC,,, | Performed by: PEDIATRICS

## 2019-06-26 PROCEDURE — 93005 EKG 12-LEAD: ICD-10-PCS | Mod: S$GLB,,, | Performed by: PEDIATRICS

## 2019-06-26 PROCEDURE — 99214 OFFICE O/P EST MOD 30 MIN: CPT | Mod: S$GLB,,, | Performed by: PEDIATRICS

## 2019-06-26 PROCEDURE — 93303 ECHO TRANSTHORACIC: CPT | Mod: S$GLB,,, | Performed by: PEDIATRICS

## 2019-06-26 PROCEDURE — 99999 PR PBB SHADOW E&M-EST. PATIENT-LVL I: ICD-10-PCS | Mod: PBBFAC,,,

## 2019-06-26 PROCEDURE — 93010 EKG 12-LEAD: ICD-10-PCS | Mod: S$GLB,,, | Performed by: INTERNAL MEDICINE

## 2019-06-26 PROCEDURE — 99999 PR PBB SHADOW E&M-EST. PATIENT-LVL I: CPT | Mod: PBBFAC,,,

## 2019-06-26 PROCEDURE — 93227 XTRNL ECG REC<48 HR R&I: CPT | Mod: S$GLB,,, | Performed by: PEDIATRICS

## 2019-06-26 PROCEDURE — 93227: ICD-10-PCS | Mod: S$GLB,,, | Performed by: PEDIATRICS

## 2019-06-26 NOTE — PROGRESS NOTES
2019    re:Samantha Lopez  :1985    Steve Arevalo MD  [address] [facility] [address]    [facility]     Samantha Lopez is a 33 y.o. 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  Diagnoses:  1.  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  2.  Status post right ventricle to pulmonary artery conduit replacement in  with a 22 mm porcine valved conduit.  3.  Currently with mild to at worst moderate pulmonary stenosis with estimated right ventricular pressures less than half systemic and likely moderate pulmonary insufficiency.  There is excellent right ventricular function and at worst mild right ventricular enlargement.  4.  Very reassuring CPX testing and cardiac MRI 2016 prior to first pregnancy.  5.  Mild dilation of the aortic root with mild aortic insufficiency.  This is typical with her disease and needs to be followed long-term.  6.  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  7.  Now s/p delivery of healthy baby 2019.  Now with IUD in place.  8.  At risk for arrhythmia, likely with PACs or PVCs at present.  9.  History of left lower lobe pneumonia, May 2019    Discussion:  Overall, she looks great.  She tolerated her 2nd pregnancy well, and she has no interest in additional pregnancies.  My major concern is her pulmonary valve insufficiency.  She will require pulmonary valve replacement in the future, but this can likely be accomplished in the cath lab.  A Holter was placed today.  In August, she will come down for a cardiac MRI and CPX testing.  Further recommendations will be based on those findings.  I do not think  that her 2 episodes of pneumonia are cardiac in origin, but we will look for any anatomic abnormalities at the time of her MRI.  Her low-dose aspirin is likely safe during breastfeeding.  However, the indication for aspirin is not clear in this situation, so we will just hold it until she is done breastfeeding.    Plan:  1.  Fine to hold aspirin for now.  She will restart the aspirin once she stops breastfeeding.  2.  Endocarditis prophylaxis is recommended.  Given the risk of endocarditis with her bioprosthetic pulmonary valve, I WOULD recommend prophylaxis even with a vaginal delivery.  3.  CPX testing and cardiac MRI in August.  Further recommendations will be based on the results of those studies.  4.  Healthy diet.  Regular low to moderate intensity exercise.  5.  We will get a 24 hr Holter today.    Interval history:  She had her baby 5 months ago.  In general, has done well.  Not taking aspirin due to breast feeding concerns.  Occasional palpitations - once a week.  Has been having over 5 years.  Heart skips a beat.  Very brief.  No associated symptoms.  Occur at rest.    PMH:  She underwent a right sided BT shunt at 2 months of age followed by a Rastelli operation at 3 years of age, both performed by Dr. Deleon in [facility].  She subsequently underwent replacement of the right ventricle to pulmonary artery conduit with a 22 mm porcine valve conduit on June 7, 2007 in [place], also by Dr. Deleon.  Until a few years ago, she was followed at [place] by Dr. Olivares.    The review of systems is as noted above. It is otherwise negative for other symptoms related to the general, neurological, psychiatric, endocrine, gastrointestinal, genitourinary, respiratory, dermatologic, musculoskeletal, hematologic, and immunologic systems.    Past Medical History:   Diagnosis Date    Double outlet right ventricle     Migraine headache     Pneumonia     April 2019     Single live birth      Past Surgical  History:   Procedure Laterality Date    ROSENDO PROCEDURE      BT shunt in [facility] by Dr. Deleon at 2 months of age    NASAL SEPTUM SURGERY      RASTELLI PROCEDURE      in [facility] by Dr. Deleon at 3 years of age    RV to PA conduit  06/07/2007    at [place] by Dr. Deleon - 22mm porcine valved conduit     Family History   Problem Relation Age of Onset    Breast cancer Maternal Aunt 40        bilateral mastectomy    Stroke Maternal Grandmother     Stroke Maternal Grandfather     Colon cancer Neg Hx     Diabetes Neg Hx     Hypertension Neg Hx     Ovarian cancer Neg Hx     Congenital heart disease Neg Hx      Social History     Socioeconomic History    Marital status:      Spouse name: Not on file    Number of children: Not on file    Years of education: Not on file    Highest education level: Not on file   Occupational History    Not on file   Social Needs    Financial resource strain: Not on file    Food insecurity:     Worry: Not on file     Inability: Not on file    Transportation needs:     Medical: Not on file     Non-medical: Not on file   Tobacco Use    Smoking status: Never Smoker    Smokeless tobacco: Never Used   Substance and Sexual Activity    Alcohol use: No     Frequency: Never    Drug use: No    Sexual activity: Yes     Partners: Male     Birth control/protection: OCP   Lifestyle    Physical activity:     Days per week: Not on file     Minutes per session: Not on file    Stress: Not on file   Relationships    Social connections:     Talks on phone: Not on file     Gets together: Not on file     Attends Sabianist service: Not on file     Active member of club or organization: Not on file     Attends meetings of clubs or organizations: Not on file     Relationship status: Not on file   Other Topics Concern    Not on file   Social History Narrative    She has a PhD from [employer].  She is  and lives in [place].  Her  is an internist at [employer].  She also works at [employer].     Current Outpatient Medications on File Prior to Visit   Medication Sig Dispense Refill    aspirin 81 MG Chew Take 1 tablet (81 mg total) by mouth once daily.  0    PNV 11-iron fum-folic acid-om3 (VIVA DHA) 28-1-200 mg Cap Take 1 capsule by mouth once daily. 30 each 12    PRENATE MINI, FERR ASP GLYCIN, 18-1-350 mg Cap TK 1 C PO QD  9    sumatriptan (IMITREX) 50 MG tablet Take 50 mg by mouth every 2 (two) hours as needed.       No current facility-administered medications on file prior to visit.      Review of patient's allergies indicates:  No Known Allergies    /65 (BP Location: Right arm, Patient Position: Sitting)   Pulse 85   Ht 5' 7.72" (1.72 m)   Wt 61.1 kg (134 lb 13 oz)   SpO2 98%   BMI 20.67 kg/m²   In general, she is a very healthy-appearing nondysmorphic female in no apparent distress.  The eyes, nares, and oropharynx are clear.  Eyelids and conjunctiva are normal without drainage or erythema.  Pupils equal and round bilaterally.  The head is normocephalic and atraumatic.  The neck is supple without jugular venous distention or thyroid enlargement.  The lungs are clear to auscultation bilaterally.  There is a well-healed right thoracotomy and median sternotomy scar.  The first heart sound is normal.  The second heart sound is fixed and split.  There is a grade 3/6 systolic ejection murmur heard best at the upper left sternal border with radiation to the lung fields bilaterally.  There is a 1/6 short diastolic murmur as well.  The abdominal exam is benign without hepatosplenomegaly, tenderness.  She has a gravid uterus.  Pulses are normal in all 4 extremities with brisk capillary refill and no clubbing, cyanosis, or edema.  No rashes are noted.    I personally reviewed the following tests performed today and my interpretation follows:  EKG reveals normal sinus rhythm with an incomplete right bundle branch block pattern - " unchanged.      Her echocardiogram today reveals:  History of DORV with malposed great vessels and PS S/P Rastelli:  Postpartum evaluation -   Moderately dilated inferior vena cava and right atrium  Mild tricuspid insufficiency at velocity suggesting right ventricular pressure 53 mmHg plus right atrial pressure.   Mild dilation and hypertrophy of the right ventricle with qualitatively good right ventricular systolic function.  No evidence of residual left to right shunt at intracardiac baffle  Valved conduit from RV to pulmonary arteries with peak velocity <2.3 m/sec., mean gradient <13 mmHg and minimally restricted insufficiency.  LPA with no obvious stenosis.  RPA difficult to demonstrate with color Doppler demonstrating no significant stenosis.    Left atrium is compressed by posterior structures.  Left ventricle is normal in size  Prominent paradoxical septal motion with good movement of the left ventricular free wall, SF =29% and EF qualitatively estimated 50 -55% from apical  views  Limited images with no acceleration across VSD and intracardiac conduit to aortic valve.   Mild aortic insufficiency arising at central jet.   Large ascending aorta arising anteriorly measuring 33 mm (previously <35 mm).    She had CPX testing February 2016.  I reviewed that study.  There were no arrhythmias.  There was no desaturation.  Her exercise tolerance was excellent:  4) The PkVO2 was 40.8 ml/kg/min which is 112% of predicted equating to a functional capacity of 11.7 METS indicating normal functional status.     I also reviewed a cardiac MRI performed February 16, 2016 in [place].  Excellent biventricular function is noted.  There is no significant enlargement of the right ventricle.  Measurements actually suggest a right ventricular volume less than the left ventricular volume, although I do not believe this.  Unrestricted pulmonary valve insufficiency is noted.  The branch pulmonary arteries look good.  Very mild  dilation of the aortic root is noted with a diameter less than 4 cm.    Thank you for referring this patient to our clinic.  Please call with any questions.    Sincerely,        Be Levine MD  Pediatric Cardiology  Adult Congenital Heart Disease  Pediatric Heart Failure and Transplantation  [facility] [address] [place]  [address]  [phone]

## 2019-07-10 ENCOUNTER — PATIENT MESSAGE (OUTPATIENT)
Dept: PEDIATRIC CARDIOLOGY | Facility: CLINIC | Age: 34
End: 2019-07-10

## 2019-07-10 DIAGNOSIS — Q24.9 ADULT CONGENITAL HEART DISEASE: Primary | ICD-10-CM

## 2019-07-10 LAB
OHS CV EVENT MONITOR DAY: 1
OHS CV HOLTER LENGTH DECIMAL HOURS: 25
OHS CV HOLTER LENGTH HOURS: 1
OHS CV HOLTER LENGTH MINUTES: 0

## 2019-08-05 ENCOUNTER — PATIENT MESSAGE (OUTPATIENT)
Dept: PEDIATRIC CARDIOLOGY | Facility: CLINIC | Age: 34
End: 2019-08-05

## 2019-09-03 ENCOUNTER — PATIENT MESSAGE (OUTPATIENT)
Dept: PEDIATRIC CARDIOLOGY | Facility: CLINIC | Age: 34
End: 2019-09-03

## 2019-09-24 ENCOUNTER — TELEPHONE (OUTPATIENT)
Dept: PEDIATRIC CARDIOLOGY | Facility: CLINIC | Age: 34
End: 2019-09-24

## 2019-09-24 ENCOUNTER — HOSPITAL ENCOUNTER (OUTPATIENT)
Dept: RADIOLOGY | Facility: HOSPITAL | Age: 34
Discharge: HOME | End: 2019-09-24
Attending: PEDIATRICS
Payer: COMMERCIAL

## 2019-09-24 ENCOUNTER — HOSPITAL ENCOUNTER (OUTPATIENT)
Dept: CARDIOLOGY | Facility: CLINIC | Age: 34
Discharge: HOME | End: 2019-09-24
Attending: PEDIATRICS
Payer: COMMERCIAL

## 2019-09-24 VITALS
BODY MASS INDEX: 20.72 KG/M2 | SYSTOLIC BLOOD PRESSURE: 112 MMHG | WEIGHT: 132 LBS | HEART RATE: 78 BPM | DIASTOLIC BLOOD PRESSURE: 80 MMHG | HEIGHT: 67 IN

## 2019-09-24 DIAGNOSIS — Q24.9 CONGENITAL HEART DISEASE: ICD-10-CM

## 2019-09-24 LAB
CV STRESS BASE HR: 78 BPM
DIASTOLIC BLOOD PRESSURE: 80 MMHG
OHS CV CPX 1 MINUTE RECOVERY HEART RATE: 169 BPM
OHS CV CPX 85 PERCENT MAX PREDICTED HEART RATE MALE: 149
OHS CV CPX ANAEROBIC THRESHOLD DIASTOLIC BLOOD PRESSURE: 72 MMHG
OHS CV CPX ANAEROBIC THRESHOLD HEART RATE: 151
OHS CV CPX ANAEROBIC THRESHOLD RATE PRESSURE PRODUCT: NORMAL
OHS CV CPX ANAEROBIC THRESHOLD SYSTOLIC BLOOD PRESSURE: 123
OHS CV CPX DATA GRADE - AT: 11.9
OHS CV CPX DATA GRADE - PEAK: 17.4
OHS CV CPX DATA O2 SAT - PEAK: 92
OHS CV CPX DATA O2 SAT - REST: 96
OHS CV CPX DATA SPEED - AT: 3.3
OHS CV CPX DATA SPEED - PEAK: 4.4
OHS CV CPX DATA TIME - AT: 5.97
OHS CV CPX DATA TIME - PEAK: 8.25
OHS CV CPX DATA VE/VCO2 - AT: 34
OHS CV CPX DATA VE/VCO2 - PEAK: 35
OHS CV CPX DATA VE/VO2 - AT: 32
OHS CV CPX DATA VE/VO2 - PEAK: 44
OHS CV CPX DATA VO2 - AT: 24.1
OHS CV CPX DATA VO2 - PEAK: 29.3
OHS CV CPX DATA VO2 - REST: 4.7
OHS CV CPX FEV1/FVC: 0.79
OHS CV CPX FORCED EXPIRATORY VOLUME: 2.78
OHS CV CPX FORCED VITAL CAPACITY (FVC): 3.5
OHS CV CPX HIGHEST VO: 32.9
OHS CV CPX MAX PREDICTED HEART RATE: 175
OHS CV CPX MAXIMAL VOLUNTARY VENTILATION (MVV) PREDICTED: 111.2
OHS CV CPX MAXIMAL VOLUNTARY VENTILATION (MVV): 84
OHS CV CPX MAXIUMUM EXERCISE VENTILATION (VE MAX): 71
OHS CV CPX PATIENT AGE: 35
OHS CV CPX PATIENT HEIGHT IN: 67
OHS CV CPX PATIENT IS FEMALE AGE 11-19: 0
OHS CV CPX PATIENT IS FEMALE AGE GREATER THAN 19: 1
OHS CV CPX PATIENT IS FEMALE AGE LESS THAN 11: 0
OHS CV CPX PATIENT IS FEMALE: 1
OHS CV CPX PATIENT IS MALE AGE 11-25: 0
OHS CV CPX PATIENT IS MALE AGE GREATER THAN 25: 0
OHS CV CPX PATIENT IS MALE AGE LESS THAN 11: 0
OHS CV CPX PATIENT IS MALE GREATER THAN 18: 0
OHS CV CPX PATIENT IS MALE LESS THAN OR EQUAL TO 18: 0
OHS CV CPX PATIENT IS MALE: 0
OHS CV CPX PATIENT WEIGHT RETURNED IN OZ: 2112
OHS CV CPX PEAK DIASTOLIC BLOOD PRESSURE: 60 MMHG
OHS CV CPX PEAK HEAR RATE: 183 BPM
OHS CV CPX PEAK RATE PRESSURE PRODUCT: NORMAL
OHS CV CPX PEAK SYSTOLIC BLOOD PRESSURE: 130 MMHG
OHS CV CPX PERCENT BODY FAT: 9.8
OHS CV CPX PERCENT MAX PREDICTED HEART RATE ACHIEVED: 104
OHS CV CPX PREDICTED VO2: 32.9 ML/KG/MIN
OHS CV CPX RATE PRESSURE PRODUCT PRESENTING: 8736
OHS CV CPX REST PET CO2: 29
OHS CV CPX VE/VCO2 SLOPE: 28.2
STRESS ECHO POST EXERCISE DUR MIN: 8 MINUTES
STRESS ECHO POST EXERCISE DUR SEC: 15 SECONDS
SYSTOLIC BLOOD PRESSURE: 112 MMHG

## 2019-09-24 PROCEDURE — 75561 MRI CARDIAC MORPHOLOGY FUNCTION W WO: ICD-10-PCS | Mod: 26,,, | Performed by: INTERNAL MEDICINE

## 2019-09-24 PROCEDURE — 94621 CARDIOPULM EXERCISE TESTING: CPT | Mod: S$GLB,,, | Performed by: INTERNAL MEDICINE

## 2019-09-24 PROCEDURE — 75561 CARDIAC MRI FOR MORPH W/DYE: CPT | Mod: 26,,, | Performed by: INTERNAL MEDICINE

## 2019-09-24 PROCEDURE — 25500020 PHARM REV CODE 255: Performed by: PEDIATRICS

## 2019-09-24 PROCEDURE — 94621 CARDIOPULMONARY EXERCISE TESTING (CUPID ONLY): ICD-10-PCS | Mod: S$GLB,,, | Performed by: INTERNAL MEDICINE

## 2019-09-24 PROCEDURE — 75561 CARDIAC MRI FOR MORPH W/DYE: CPT | Mod: TC

## 2019-09-24 PROCEDURE — A9577 INJ MULTIHANCE: HCPCS | Performed by: PEDIATRICS

## 2019-09-24 RX ADMIN — GADOBENATE DIMEGLUMINE 26 ML: 529 INJECTION, SOLUTION INTRAVENOUS at 10:09

## 2019-10-03 ENCOUNTER — TELEPHONE (OUTPATIENT)
Dept: PEDIATRIC CARDIOLOGY | Facility: CLINIC | Age: 34
End: 2019-10-03

## 2019-10-03 NOTE — TELEPHONE ENCOUNTER
Rebit message sent last week.  Message left on her phone today.  MRI suggestive of hemangiomas on liver.  I recommended she follow up with her PCP or a hepatologist, but I strongly suspect that this is all benign.

## 2019-10-09 ENCOUNTER — PATIENT MESSAGE (OUTPATIENT)
Dept: PEDIATRIC CARDIOLOGY | Facility: CLINIC | Age: 34
End: 2019-10-09

## 2021-05-03 ENCOUNTER — PATIENT MESSAGE (OUTPATIENT)
Dept: PEDIATRIC CARDIOLOGY | Facility: CLINIC | Age: 36
End: 2021-05-03

## 2021-05-03 DIAGNOSIS — I37.0 NONRHEUMATIC PULMONARY VALVE STENOSIS: ICD-10-CM

## 2021-05-03 DIAGNOSIS — Q24.9 ADULT CONGENITAL HEART DISEASE: ICD-10-CM

## 2021-05-03 DIAGNOSIS — Z98.890 PERSONAL HISTORY OF SURGERY TO HEART AND GREAT VESSELS, PRESENTING HAZARDS TO HEALTH: ICD-10-CM

## 2021-05-03 DIAGNOSIS — Z95.2 PULMONARY VALVE REPLACED: ICD-10-CM

## 2021-05-03 DIAGNOSIS — Q20.1 DOUBLE OUTLET RIGHT VENTRICLE: Primary | ICD-10-CM

## 2021-11-16 ENCOUNTER — PATIENT MESSAGE (OUTPATIENT)
Dept: PEDIATRIC CARDIOLOGY | Facility: CLINIC | Age: 36
End: 2021-11-16
Payer: COMMERCIAL

## 2022-01-13 ENCOUNTER — PATIENT MESSAGE (OUTPATIENT)
Dept: CARDIOLOGY | Facility: CLINIC | Age: 37
End: 2022-01-13

## 2022-03-10 ENCOUNTER — OFFICE VISIT (OUTPATIENT)
Dept: CARDIOLOGY | Facility: CLINIC | Age: 37
End: 2022-03-10
Attending: PEDIATRICS
Payer: COMMERCIAL

## 2022-03-10 ENCOUNTER — PATIENT MESSAGE (OUTPATIENT)
Dept: CARDIOLOGY | Facility: CLINIC | Age: 37
End: 2022-03-10

## 2022-03-10 ENCOUNTER — HOSPITAL ENCOUNTER (OUTPATIENT)
Dept: CARDIOLOGY | Facility: CLINIC | Age: 37
Discharge: HOME | End: 2022-03-10
Payer: COMMERCIAL

## 2022-03-10 ENCOUNTER — HOSPITAL ENCOUNTER (OUTPATIENT)
Dept: CARDIOLOGY | Facility: HOSPITAL | Age: 37
Discharge: HOME | End: 2022-03-10
Attending: PEDIATRICS
Payer: COMMERCIAL

## 2022-03-10 ENCOUNTER — HOSPITAL ENCOUNTER (OUTPATIENT)
Dept: PEDIATRIC CARDIOLOGY | Facility: HOSPITAL | Age: 37
Discharge: HOME | End: 2022-03-10
Attending: PEDIATRICS
Payer: COMMERCIAL

## 2022-03-10 VITALS
SYSTOLIC BLOOD PRESSURE: 111 MMHG | BODY MASS INDEX: 19.34 KG/M2 | WEIGHT: 127.19 LBS | DIASTOLIC BLOOD PRESSURE: 68 MMHG | HEART RATE: 68 BPM | WEIGHT: 125 LBS | HEIGHT: 68 IN | BODY MASS INDEX: 18.94 KG/M2 | OXYGEN SATURATION: 100 %

## 2022-03-10 DIAGNOSIS — Q24.9 ADULT CONGENITAL HEART DISEASE: ICD-10-CM

## 2022-03-10 DIAGNOSIS — Z98.890 PERSONAL HISTORY OF SURGERY TO HEART AND GREAT VESSELS, PRESENTING HAZARDS TO HEALTH: ICD-10-CM

## 2022-03-10 DIAGNOSIS — Q20.1 DOUBLE OUTLET RIGHT VENTRICLE: ICD-10-CM

## 2022-03-10 DIAGNOSIS — I37.0 NONRHEUMATIC PULMONARY VALVE STENOSIS: ICD-10-CM

## 2022-03-10 DIAGNOSIS — Z95.2 PULMONARY VALVE REPLACED: ICD-10-CM

## 2022-03-10 DIAGNOSIS — Z95.2 PULMONARY VALVE REPLACED: Primary | ICD-10-CM

## 2022-03-10 LAB
ASCENDING AORTA: 3.06 CM
AV INDEX (PROSTH): 0.7
AV MEAN GRADIENT: 4 MMHG
AV PEAK GRADIENT: 7 MMHG
AV VALVE AREA: 2.67 CM2
AV VELOCITY RATIO: 0.64
BSA FOR ECHO PROCEDURE: 1.65 M2
CV ECHO LV RWT: 0.36 CM
DOP CALC AO PEAK VEL: 1.35 M/S
DOP CALC AO VTI: 25.83 CM
DOP CALC LVOT AREA: 3.8 CM2
DOP CALC LVOT DIAMETER: 2.2 CM
DOP CALC LVOT PEAK VEL: 0.86 M/S
DOP CALC LVOT STROKE VOLUME: 69.04 CM3
DOP CALCLVOT PEAK VEL VTI: 18.17 CM
E WAVE DECELERATION TIME: 218.68 MSEC
E/A RATIO: 3.34
E/E' RATIO: 6.93 M/S
ECHO LV POSTERIOR WALL: 0.8 CM (ref 0.6–1.1)
EJECTION FRACTION: 55 %
FRACTIONAL SHORTENING: 43 % (ref 28–44)
INTERVENTRICULAR SEPTUM: 0.72 CM (ref 0.6–1.1)
LA MAJOR: 3.32 CM
LA MINOR: 3.18 CM
LA WIDTH: 3.57 CM
LEFT ATRIUM SIZE: 2.8 CM
LEFT ATRIUM VOLUME INDEX: 16.5 ML/M2
LEFT ATRIUM VOLUME: 27.6 CM3
LEFT INTERNAL DIMENSION IN SYSTOLE: 2.53 CM (ref 2.1–4)
LEFT VENTRICLE DIASTOLIC VOLUME INDEX: 53.9 ML/M2
LEFT VENTRICLE DIASTOLIC VOLUME: 90.02 ML
LEFT VENTRICLE MASS INDEX: 62 G/M2
LEFT VENTRICLE SYSTOLIC VOLUME INDEX: 13.8 ML/M2
LEFT VENTRICLE SYSTOLIC VOLUME: 22.99 ML
LEFT VENTRICULAR INTERNAL DIMENSION IN DIASTOLE: 4.45 CM (ref 3.5–6)
LEFT VENTRICULAR MASS: 104.31 G
LV LATERAL E/E' RATIO: 5.11 M/S
LV SEPTAL E/E' RATIO: 10.78 M/S
MV PEAK A VEL: 0.29 M/S
MV PEAK E VEL: 0.97 M/S
MV STENOSIS PRESSURE HALF TIME: 63.42 MS
MV VALVE AREA P 1/2 METHOD: 3.47 CM2
PISA TR MAX VEL: 3.55 M/S
PV PEAK VELOCITY: 2.41 CM/S
RA MAJOR: 3.97 CM
RA WIDTH: 4.28 CM
RIGHT VENTRICULAR END-DIASTOLIC DIMENSION: 4.37 CM
RV TISSUE DOPPLER FREE WALL SYSTOLIC VELOCITY 1 (APICAL 4 CHAMBER VIEW): 6.76 CM/S
SINUS: 3.97 CM
STJ: 3.28 CM
TDI LATERAL: 0.19 M/S
TDI SEPTAL: 0.09 M/S
TDI: 0.14 M/S
TR MAX PG: 50 MMHG
TRICUSPID ANNULAR PLANE SYSTOLIC EXCURSION: 1.61 CM

## 2022-03-10 PROCEDURE — 3078F PR MOST RECENT DIASTOLIC BLOOD PRESSURE < 80 MM HG: ICD-10-PCS | Mod: CPTII,S$GLB,, | Performed by: PEDIATRICS

## 2022-03-10 PROCEDURE — 93005 EKG 12-LEAD: ICD-10-PCS | Mod: S$GLB,,, | Performed by: PEDIATRICS

## 2022-03-10 PROCEDURE — 93244 EXT ECG>48HR<7D REV&INTERPJ: CPT | Mod: ,,, | Performed by: PEDIATRICS

## 2022-03-10 PROCEDURE — 93303 ECHO (CUPID ONLY): ICD-10-PCS | Mod: 26,,, | Performed by: PEDIATRICS

## 2022-03-10 PROCEDURE — 93010 EKG 12-LEAD: ICD-10-PCS | Mod: S$GLB,,, | Performed by: INTERNAL MEDICINE

## 2022-03-10 PROCEDURE — 1159F MED LIST DOCD IN RCRD: CPT | Mod: CPTII,S$GLB,, | Performed by: PEDIATRICS

## 2022-03-10 PROCEDURE — 93303 ECHO TRANSTHORACIC: CPT | Mod: 26,,, | Performed by: PEDIATRICS

## 2022-03-10 PROCEDURE — 99999 PR PBB SHADOW E&M-EST. PATIENT-LVL III: CPT | Mod: PBBFAC,,, | Performed by: PEDIATRICS

## 2022-03-10 PROCEDURE — 93320 ECHO (CUPID ONLY): ICD-10-PCS | Mod: 26,,, | Performed by: PEDIATRICS

## 2022-03-10 PROCEDURE — 93005 ELECTROCARDIOGRAM TRACING: CPT | Mod: S$GLB,,, | Performed by: PEDIATRICS

## 2022-03-10 PROCEDURE — 1159F PR MEDICATION LIST DOCUMENTED IN MEDICAL RECORD: ICD-10-PCS | Mod: CPTII,S$GLB,, | Performed by: PEDIATRICS

## 2022-03-10 PROCEDURE — 3074F PR MOST RECENT SYSTOLIC BLOOD PRESSURE < 130 MM HG: ICD-10-PCS | Mod: CPTII,S$GLB,, | Performed by: PEDIATRICS

## 2022-03-10 PROCEDURE — 93010 ELECTROCARDIOGRAM REPORT: CPT | Mod: S$GLB,,, | Performed by: INTERNAL MEDICINE

## 2022-03-10 PROCEDURE — 93244 CV 3-14 DAY PEDIATRIC HOLTER MONITOR (CUPID ONLY): ICD-10-PCS | Mod: ,,, | Performed by: PEDIATRICS

## 2022-03-10 PROCEDURE — 3074F SYST BP LT 130 MM HG: CPT | Mod: CPTII,S$GLB,, | Performed by: PEDIATRICS

## 2022-03-10 PROCEDURE — 93325 DOPPLER ECHO COLOR FLOW MAPG: CPT

## 2022-03-10 PROCEDURE — 3008F PR BODY MASS INDEX (BMI) DOCUMENTED: ICD-10-PCS | Mod: CPTII,S$GLB,, | Performed by: PEDIATRICS

## 2022-03-10 PROCEDURE — 93325 ECHO (CUPID ONLY): ICD-10-PCS | Mod: 26,,, | Performed by: PEDIATRICS

## 2022-03-10 PROCEDURE — 99214 OFFICE O/P EST MOD 30 MIN: CPT | Mod: 25,S$GLB,, | Performed by: PEDIATRICS

## 2022-03-10 PROCEDURE — 93325 DOPPLER ECHO COLOR FLOW MAPG: CPT | Mod: 26,,, | Performed by: PEDIATRICS

## 2022-03-10 PROCEDURE — 93242 EXT ECG>48HR<7D RECORDING: CPT

## 2022-03-10 PROCEDURE — 99214 PR OFFICE/OUTPT VISIT, EST, LEVL IV, 30-39 MIN: ICD-10-PCS | Mod: 25,S$GLB,, | Performed by: PEDIATRICS

## 2022-03-10 PROCEDURE — 3008F BODY MASS INDEX DOCD: CPT | Mod: CPTII,S$GLB,, | Performed by: PEDIATRICS

## 2022-03-10 PROCEDURE — 93320 DOPPLER ECHO COMPLETE: CPT | Mod: 26,,, | Performed by: PEDIATRICS

## 2022-03-10 PROCEDURE — 99999 PR PBB SHADOW E&M-EST. PATIENT-LVL III: ICD-10-PCS | Mod: PBBFAC,,, | Performed by: PEDIATRICS

## 2022-03-10 PROCEDURE — 3078F DIAST BP <80 MM HG: CPT | Mod: CPTII,S$GLB,, | Performed by: PEDIATRICS

## 2022-03-10 RX ORDER — AMOXICILLIN 500 MG/1
500 CAPSULE ORAL 3 TIMES DAILY
COMMUNITY
Start: 2022-03-02 | End: 2023-09-19

## 2022-03-10 NOTE — PROGRESS NOTES
03/10/2022    re:Samantha Lopez  :1985    Steve Arevalo MD  [address] [facility] [address]    [facility]     Samantha Lopez is a 36 y.o. 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  Diagnoses:  1.  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  - Status post right ventricle to pulmonary artery conduit replacement in  with a 22 mm porcine valved conduit.  - Currently with mild to at worst moderate pulmonary stenosis with estimated right ventricular pressures less than half systemic and likely moderate pulmonary insufficiency.    - Mild dilation of the aortic root with mild aortic insufficiency.  This is typical with her disease and needs to be followed long-term.  2.  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  3.  2 healthy children    Discussion:  Her echocardiogram does not look significantly changed compared to a few years ago.  She likely has moderate insufficiency of the pulmonary valve, and I suspect she will need a catheter based pulmonary valve in the future.  When I see her in a year, we will repeat an MRI.  We can also repeat CPX testing at that time.  She will report any worsening exercise tolerance.    Plan:  1.  Continue baby aspirin  2.  Endocarditis prophylaxis is recommended.    3.  Follow-up 1 year with CPX testing and a cardiac MRI   4.  Healthy diet.  Regular low to moderate intensity exercise.  5.  We will get a 24 hr Holter today.    Interval history:  I last saw her almost 3 years ago.  She and her  are moving to [place].  She has been following closely with the team in [place].  She denies any cardiac  symptoms except for occasional palpitations.  She notices this about once every week or 2. It typically occurs when she is in bed at night.  She exercises regularly with no palpitations associated with exercise.  No chest pain.  No dyspnea on exertion.  No cyanosis or edema.  No other new problems.    PMH:  She underwent a right sided BT shunt at 2 months of age followed by a Rastelli operation at 3 years of age, both performed by Dr. Deleon in [facility].  She subsequently underwent replacement of the right ventricle to pulmonary artery conduit with a 22 mm porcine valve conduit on June 7, 2007 in [place], also by Dr. Deleon.  In the past, she was followed at [place] by Dr. Olivares.    The review of systems is as noted above. It is otherwise negative for other symptoms related to the general, neurological, psychiatric, endocrine, gastrointestinal, genitourinary, respiratory, dermatologic, musculoskeletal, hematologic, and immunologic systems.    Past Medical History:   Diagnosis Date    Double outlet right ventricle     Migraine headache     Pneumonia     April 2019     Single live birth      Past Surgical History:   Procedure Laterality Date    ROSENDO PROCEDURE      BT shunt in [facility] by Dr. Deleon at 2 months of age    NASAL SEPTUM SURGERY      RASTELLI PROCEDURE      in [facility] by Dr. Deleon at 3 years of age    RV to PA conduit  06/07/2007    at [place] by Dr. Deleon - 22mm porcine valved conduit     Family History   Problem Relation Age of Onset    Breast cancer Maternal Aunt 40        bilateral mastectomy    Stroke Maternal Grandmother     Stroke Maternal Grandfather     Colon cancer Neg Hx     Diabetes Neg Hx     Hypertension Neg Hx     Ovarian cancer Neg Hx     Congenital heart disease Neg Hx      Social History     Socioeconomic History    Marital status:    Tobacco Use    Smoking status: Never Smoker    Smokeless tobacco: Never Used   Substance  and Sexual Activity    Alcohol use: No    Drug use: No    Sexual activity: Yes     Partners: Male     Birth control/protection: OCP   Social History Narrative    She has a PhD from [employer].  She is  and lives in [place].  Her  is an internist at [employer].  She also works at [employer].     Current Outpatient Medications on File Prior to Visit   Medication Sig Dispense Refill    amoxicillin (AMOXIL) 500 MG capsule Take 500 mg by mouth 3 (three) times daily.      aspirin 81 MG Chew Take 1 tablet (81 mg total) by mouth once daily.  0    [DISCONTINUED] PNV 11-iron fum-folic acid-om3 (VIVA DHA) 28-1-200 mg Cap Take 1 capsule by mouth once daily. (Patient taking differently: Take 2 capsules by mouth once daily.) 30 each 12    [DISCONTINUED] PRENATE MINI, FERR ASP GLYCIN, 18-1-350 mg Cap TK 1 C PO QD  9    [DISCONTINUED] sumatriptan (IMITREX) 50 MG tablet Take 50 mg by mouth every 2 (two) hours as needed.       No current facility-administered medications on file prior to visit.     Review of patient's allergies indicates:  No Known Allergies    /68 (BP Location: Left arm, Patient Position: Sitting, BP Method: Large (Automatic))   Pulse 68   Wt 57.7 kg (127 lb 3.3 oz)   SpO2 100%   BMI 19.34 kg/m²   In general, she is a very healthy-appearing nondysmorphic female in no apparent distress.  The eyes, nares, and oropharynx are clear.  Eyelids and conjunctiva are normal without drainage or erythema.  Pupils equal and round bilaterally.  The head is normocephalic and atraumatic.  The neck is supple without jugular venous distention or thyroid enlargement.  The lungs are clear to auscultation bilaterally.  There is a well-healed right thoracotomy and median sternotomy scar.  The first heart sound is normal.  The second heart sound is fixed and split.  There is a grade 3/6 systolic ejection murmur heard best at the upper left sternal border with radiation to the  lung fields bilaterally.  There is a 1/6 short diastolic murmur as well.  The abdominal exam is benign without hepatosplenomegaly, tenderness.  She has a gravid uterus.  Pulses are normal in all 4 extremities with brisk capillary refill and no clubbing, cyanosis, or edema.  No rashes are noted.    I personally reviewed the following tests performed today and my interpretation follows:  EKG reveals normal sinus rhythm with an incomplete right bundle branch block pattern - unchanged.      Her echocardiogram today reveals:  Results for orders placed during the hospital encounter of 03/10/22    Echo Color Flow Doppler? Yes    Interpretation Summary  CONGENITAL CARDIAC HISTORY:  DORV, d-TGA and pulmonary stenosis.  S/P  R modified BT shunt 2 months old.  S/P Rastelli - 3 yrs old in [facility].  S/P 22mm Porcine valved conduit [facility]  - All interventions by Miguel Ziegler    SEGMENTAL CARDIAC CONNECTIONS:  Abdominal situs is solitus.  Atrial situs is normal.  Atrioventricular concordance.  Grossly normal structure of tricuspid and mitral valves.  RV dilation and RV hypertrophy.  Double outlet right ventricle with transposed great vessels  S/P intracardiac baffle and RV to PA conduit.  Aortic valve is normal.  Ventricular loop: D-loop.  Cardiac position is Levocardia.  Appears to have branching pattern consistent with left aortic arch.      IMPRESSION:  History of DORV with malposed great vessels and PS S/P Rastelli:  Moderately dilated inferior vena cava and right atrium.  Mild tricuspid insufficiency at velocity suggesting right ventricular pressure >50 mmHg plus right atrial pressure based on poorly defined Doppler profile.  Moderate dilation and mild hypertrophy of the right ventricle with qualitatively good right ventricular systolic function.  No evidence of residual left to right shunt at intracardiac baffle  Valved conduit from RV to pulmonary arteries with peak velocity <2.5 m/sec., mean  gradient <17 mmHg and minimally restricted insufficiency.  Pulmonary branches not well demonstrated in this study.  Left atrium is normal in size and compressed by posterior structures.  Left ventricle is normal in size.  Prominent paradoxical septal motion with good movement of the left ventricular free wall, SF =34% and EF qualitatively estimated 55 -60% from apical  Views  Normal indices of left ventricular diastolic function.  Limited images with no acceleration across VSD and intracardiac conduit to aortic valve.  Mild aortic insufficiency arising at eccentric, central jet.  Aortic dimensions:  Sinuses of Valsalva = 40 mm.  ST junction             = 33 mm.  Ascending aorta     = >32 mm.  Probable left aortic arch with no evidence of coarctation.  There is no obvious pericardial effusion.    Cardiac MRI 2019:  RVEDV: 165 cc  RVEDV: 97cc/m2 for BSA (nl )  RVESV:  84cc  RVESV: 50cc/m2 for BSA (nl 6-54)  RVEF:  49%  Stroke volume:  81 cc by ventricular trace  Left ventricle:    The left ventricular systolic volume is increased. There is abnormal septal motion.  The calculated LVEF is 50%. Visually LV systolic function is normal.   LVEDV: 147cc  LVEDV: 87 cc/m2 for BSA (nl 51-95)  LVESV:  74 cc  LVESV: 44 cc/m2 for BSA (nl 11-35)  LVEF: 50 %  Stroke volume: 73cc by ventricular trace  1. Normal RV volumes with RVEF of 49%  2. RV to PA conduit with the smallest dimension of 2cm2 with dimension of 15-17mm. There is moderate stenosis with a peak systolic velocity of 2.5 m/sec and moderate PI with regurgitation volume of 20cc with regurgitation fraction of 26%  3. Dilated right PA with a hypoplastic left PA (the flows are greater in the Right PA than the left PA by Q flow  4. Increased LV systolic volume with LVEF of 50%  5. Dilated sinus of Valsalva with maximum measurement of 41mm.  6. Head vessel branching:the order is right brachiocephalic with right carotid and right subclavian (the vertebral arises from the  right subclavian), followed by the left carotid, possible truncated left vertebral, and the left subclavian. There is no comprise to the right or left subclavian with history of right BT shunt. There is however truncated left vertebral.    I also reviewed a cardiac MRI performed February 16, 2016 in [place].  Excellent biventricular function is noted.  There is no significant enlargement of the right ventricle.  Measurements actually suggest a right ventricular volume less than the left ventricular volume, although I do not believe this.  Unrestricted pulmonary valve insufficiency is noted.  The branch pulmonary arteries look good.  Very mild dilation of the aortic root is noted with a diameter less than 4 cm.    Thank you for referring this patient to our clinic.  Please call with any questions.    Sincerely,        Be Levine MD  Pediatric Cardiology  Adult Congenital Heart Disease  Pediatric Heart Failure and Transplantation  [facility] [address] [place]  [address]  [phone]

## 2022-04-04 LAB
OHS CV EVENT MONITOR DAY: 1
OHS CV HOLTER HOOKUP DATE: NORMAL
OHS CV HOLTER HOOKUP TIME: NORMAL
OHS CV HOLTER LENGTH DECIMAL HOURS: 26.87
OHS CV HOLTER LENGTH HOURS: 2
OHS CV HOLTER LENGTH MINUTES: 52
OHS CV HOLTER SCAN DATE: NORMAL
OHS CV HOLTER SINUS AVERAGE HR: 79 BPM
OHS CV HOLTER SINUS MAX HR: 154 BPM
OHS CV HOLTER SINUS MIN HR: 58 BPM
OHS CV HOLTER STUDY END DATE: NORMAL
OHS CV HOLTER STUDY END TIME: NORMAL

## 2022-07-05 ENCOUNTER — RESEARCH ENCOUNTER (OUTPATIENT)
Dept: RESEARCH | Facility: HOSPITAL | Age: 37
End: 2022-07-05
Payer: COMMERCIAL

## 2022-07-05 NOTE — PROGRESS NOTES
Trial: FIDENCIO, 2021.237  PI: Dr. Levine    This note is to indicate that the patient self-enrolled in the CHI-PAULETTE study (Congenital Heart Initiative - Redefining Outcomes and Navigation to Adult Centered Care). This study is looking to improve care for future adult congenital heart defect patients. The trial consists of surveys periodically that the patient completes independently.     Please contact PETE Buchanan or Dr. Be Levine for questions.   Trial Number: [id number]  Trial Email: [email or web address]

## 2022-08-06 ENCOUNTER — PATIENT MESSAGE (OUTPATIENT)
Dept: CARDIOLOGY | Facility: CLINIC | Age: 37
End: 2022-08-06
Payer: COMMERCIAL

## 2023-01-03 ENCOUNTER — PATIENT MESSAGE (OUTPATIENT)
Dept: CARDIOLOGY | Facility: CLINIC | Age: 38
End: 2023-01-03
Payer: COMMERCIAL

## 2023-01-03 DIAGNOSIS — Z95.2 PULMONARY VALVE REPLACED: ICD-10-CM

## 2023-01-03 DIAGNOSIS — Q20.1 DOUBLE OUTLET RIGHT VENTRICLE: ICD-10-CM

## 2023-01-03 DIAGNOSIS — Q24.9 ADULT CONGENITAL HEART DISEASE: ICD-10-CM

## 2023-01-03 DIAGNOSIS — Q20.1 DOUBLE OUTLET RIGHT VENTRICLE: Primary | ICD-10-CM

## 2023-01-03 DIAGNOSIS — I37.0 NONRHEUMATIC PULMONARY VALVE STENOSIS: ICD-10-CM

## 2023-01-03 DIAGNOSIS — Q24.9 CONGENITAL MALFORMATION OF HEART, UNSPECIFIED: Primary | ICD-10-CM

## 2023-04-27 ENCOUNTER — HOSPITAL ENCOUNTER (OUTPATIENT)
Dept: CARDIOLOGY | Facility: HOSPITAL | Age: 38
Discharge: HOME | End: 2023-04-27
Attending: PEDIATRICS
Payer: COMMERCIAL

## 2023-04-27 ENCOUNTER — HOSPITAL ENCOUNTER (OUTPATIENT)
Dept: RADIOLOGY | Facility: HOSPITAL | Age: 38
Discharge: HOME | End: 2023-04-27
Attending: PEDIATRICS
Payer: COMMERCIAL

## 2023-04-27 ENCOUNTER — OFFICE VISIT (OUTPATIENT)
Dept: CARDIOLOGY | Facility: CLINIC | Age: 38
End: 2023-04-27
Payer: COMMERCIAL

## 2023-04-27 VITALS
SYSTOLIC BLOOD PRESSURE: 112 MMHG | HEIGHT: 68 IN | SYSTOLIC BLOOD PRESSURE: 130 MMHG | HEART RATE: 77 BPM | BODY MASS INDEX: 18.94 KG/M2 | DIASTOLIC BLOOD PRESSURE: 73 MMHG | WEIGHT: 126.13 LBS | HEART RATE: 95 BPM | WEIGHT: 125 LBS | DIASTOLIC BLOOD PRESSURE: 90 MMHG | OXYGEN SATURATION: 95 % | HEIGHT: 68 IN | BODY MASS INDEX: 19.12 KG/M2

## 2023-04-27 DIAGNOSIS — Q24.9 ADULT CONGENITAL HEART DISEASE: ICD-10-CM

## 2023-04-27 DIAGNOSIS — I37.0 NONRHEUMATIC PULMONARY VALVE STENOSIS: ICD-10-CM

## 2023-04-27 DIAGNOSIS — Q20.1 DOUBLE OUTLET RIGHT VENTRICLE: ICD-10-CM

## 2023-04-27 DIAGNOSIS — Z95.2 PULMONARY VALVE REPLACED: ICD-10-CM

## 2023-04-27 DIAGNOSIS — Q24.9 CONGENITAL MALFORMATION OF HEART, UNSPECIFIED: ICD-10-CM

## 2023-04-27 DIAGNOSIS — Z98.890 PERSONAL HISTORY OF SURGERY TO HEART AND GREAT VESSELS, PRESENTING HAZARDS TO HEALTH: ICD-10-CM

## 2023-04-27 DIAGNOSIS — Q24.9 ADULT CONGENITAL HEART DISEASE: Primary | ICD-10-CM

## 2023-04-27 LAB
CV STRESS BASE HR: 77 BPM
DIASTOLIC BLOOD PRESSURE: 90 MMHG
OHS CV CPX 1 MINUTE RECOVERY HEART RATE: 164 BPM
OHS CV CPX 85 PERCENT MAX PREDICTED HEART RATE MALE: 147
OHS CV CPX ANAEROBIC THRESHOLD DIASTOLIC BLOOD PRESSURE: 78 MMHG
OHS CV CPX ANAEROBIC THRESHOLD HEART RATE: 150
OHS CV CPX ANAEROBIC THRESHOLD RATE PRESSURE PRODUCT: NORMAL
OHS CV CPX ANAEROBIC THRESHOLD SYSTOLIC BLOOD PRESSURE: 140
OHS CV CPX DATA GRADE - AT: 13.8
OHS CV CPX DATA GRADE - PEAK: 18.4
OHS CV CPX DATA O2 SAT - PEAK: 98
OHS CV CPX DATA O2 SAT - REST: 99
OHS CV CPX DATA SPEED - AT: 3.6
OHS CV CPX DATA SPEED - PEAK: 5
OHS CV CPX DATA TIME - AT: 6.7
OHS CV CPX DATA TIME - PEAK: 8.75
OHS CV CPX DATA VE/VCO2 - AT: 37
OHS CV CPX DATA VE/VCO2 - PEAK: 44
OHS CV CPX DATA VE/VO2 - AT: 31
OHS CV CPX DATA VE/VO2 - PEAK: 48
OHS CV CPX DATA VO2 - AT: 28.5
OHS CV CPX DATA VO2 - PEAK: 33.5
OHS CV CPX DATA VO2 - REST: 5.6
OHS CV CPX FEV1/FVC: 0.78
OHS CV CPX FORCED EXPIRATORY VOLUME: 2.58
OHS CV CPX FORCED VITAL CAPACITY (FVC): 3.29
OHS CV CPX HIGHEST VO: 35.2
OHS CV CPX MAX PREDICTED HEART RATE: 173
OHS CV CPX MAXIMAL VOLUNTARY VENTILATION (MVV) PREDICTED: 103.2
OHS CV CPX MAXIMAL VOLUNTARY VENTILATION (MVV): 82
OHS CV CPX MAXIUMUM EXERCISE VENTILATION (VE MAX): 79
OHS CV CPX PATIENT AGE: 38
OHS CV CPX PATIENT HEIGHT IN: 68
OHS CV CPX PATIENT IS FEMALE AGE 11-19: 0
OHS CV CPX PATIENT IS FEMALE AGE GREATER THAN 19: 1
OHS CV CPX PATIENT IS FEMALE AGE LESS THAN 11: 0
OHS CV CPX PATIENT IS FEMALE: 1
OHS CV CPX PATIENT IS MALE AGE 11-25: 0
OHS CV CPX PATIENT IS MALE AGE GREATER THAN 25: 0
OHS CV CPX PATIENT IS MALE AGE LESS THAN 11: 0
OHS CV CPX PATIENT IS MALE GREATER THAN 18: 0
OHS CV CPX PATIENT IS MALE LESS THAN OR EQUAL TO 18: 0
OHS CV CPX PATIENT IS MALE: 0
OHS CV CPX PATIENT WEIGHT RETURNED IN OZ: 2000
OHS CV CPX PEAK DIASTOLIC BLOOD PRESSURE: 70 MMHG
OHS CV CPX PEAK HEAR RATE: 173 BPM
OHS CV CPX PEAK RATE PRESSURE PRODUCT: NORMAL
OHS CV CPX PEAK SYSTOLIC BLOOD PRESSURE: 154 MMHG
OHS CV CPX PERCENT BODY FAT: 8
OHS CV CPX PERCENT MAX PREDICTED HEART RATE ACHIEVED: 100
OHS CV CPX PREDICTED VO2: 35.2 ML/KG/MIN
OHS CV CPX RATE PRESSURE PRODUCT PRESENTING: 8624
OHS CV CPX REST PET CO2: 25
OHS CV CPX VE/VCO2 SLOPE: 34.6
STRESS ECHO POST EXERCISE DUR MIN: 8 MINUTES
STRESS ECHO POST EXERCISE DUR SEC: 45 SECONDS
SYSTOLIC BLOOD PRESSURE: 112 MMHG

## 2023-04-27 PROCEDURE — 3075F SYST BP GE 130 - 139MM HG: CPT | Mod: CPTII,S$GLB,, | Performed by: PEDIATRICS

## 2023-04-27 PROCEDURE — 99214 OFFICE O/P EST MOD 30 MIN: CPT | Mod: 25,S$GLB,, | Performed by: PEDIATRICS

## 2023-04-27 PROCEDURE — 3078F PR MOST RECENT DIASTOLIC BLOOD PRESSURE < 80 MM HG: ICD-10-PCS | Mod: CPTII,S$GLB,, | Performed by: PEDIATRICS

## 2023-04-27 PROCEDURE — 94621 CARDIOPULM EXERCISE TESTING: CPT | Mod: 26,,, | Performed by: INTERNAL MEDICINE

## 2023-04-27 PROCEDURE — 75561 CV CARDIAC MRI MORPHOLOGY (CUPID ONLY): ICD-10-PCS | Mod: 26,,, | Performed by: PEDIATRICS

## 2023-04-27 PROCEDURE — 75561 MRI CARDIAC MORPHOLOGY FUNCTION W WO: ICD-10-PCS | Mod: 26,,, | Performed by: STUDENT IN AN ORGANIZED HEALTH CARE EDUCATION/TRAINING PROGRAM

## 2023-04-27 PROCEDURE — 99214 PR OFFICE/OUTPT VISIT, EST, LEVL IV, 30-39 MIN: ICD-10-PCS | Mod: 25,S$GLB,, | Performed by: PEDIATRICS

## 2023-04-27 PROCEDURE — A9577 INJ MULTIHANCE: HCPCS | Performed by: PEDIATRICS

## 2023-04-27 PROCEDURE — 75561 CARDIAC MRI FOR MORPH W/DYE: CPT | Mod: 26,,, | Performed by: STUDENT IN AN ORGANIZED HEALTH CARE EDUCATION/TRAINING PROGRAM

## 2023-04-27 PROCEDURE — 3078F DIAST BP <80 MM HG: CPT | Mod: CPTII,S$GLB,, | Performed by: PEDIATRICS

## 2023-04-27 PROCEDURE — 94621 CARDIOPULM EXERCISE TESTING: CPT

## 2023-04-27 PROCEDURE — 3008F PR BODY MASS INDEX (BMI) DOCUMENTED: ICD-10-PCS | Mod: CPTII,S$GLB,, | Performed by: PEDIATRICS

## 2023-04-27 PROCEDURE — 75561 CARDIAC MRI FOR MORPH W/DYE: CPT | Mod: TC

## 2023-04-27 PROCEDURE — 1159F PR MEDICATION LIST DOCUMENTED IN MEDICAL RECORD: ICD-10-PCS | Mod: CPTII,S$GLB,, | Performed by: PEDIATRICS

## 2023-04-27 PROCEDURE — 3075F PR MOST RECENT SYSTOLIC BLOOD PRESS GE 130-139MM HG: ICD-10-PCS | Mod: CPTII,S$GLB,, | Performed by: PEDIATRICS

## 2023-04-27 PROCEDURE — 99999 PR PBB SHADOW E&M-EST. PATIENT-LVL III: CPT | Mod: PBBFAC,,, | Performed by: PEDIATRICS

## 2023-04-27 PROCEDURE — 1159F MED LIST DOCD IN RCRD: CPT | Mod: CPTII,S$GLB,, | Performed by: PEDIATRICS

## 2023-04-27 PROCEDURE — 3008F BODY MASS INDEX DOCD: CPT | Mod: CPTII,S$GLB,, | Performed by: PEDIATRICS

## 2023-04-27 PROCEDURE — 99999 PR PBB SHADOW E&M-EST. PATIENT-LVL III: ICD-10-PCS | Mod: PBBFAC,,, | Performed by: PEDIATRICS

## 2023-04-27 PROCEDURE — 25500020 PHARM REV CODE 255: Performed by: PEDIATRICS

## 2023-04-27 PROCEDURE — 75561 CARDIAC MRI FOR MORPH W/DYE: CPT | Mod: 26,,, | Performed by: PEDIATRICS

## 2023-04-27 PROCEDURE — 94621 CARDIOPULMONARY EXERCISE TESTING (CUPID ONLY): ICD-10-PCS | Mod: 26,,, | Performed by: INTERNAL MEDICINE

## 2023-04-27 RX ADMIN — GADOBENATE DIMEGLUMINE 24 ML: 529 INJECTION, SOLUTION INTRAVENOUS at 09:04

## 2023-04-27 NOTE — PROGRESS NOTES
2023    re:Samantha Lopez  :1985    Steve Arevalo MD  [address] [facility] [address]    [facility]     Samantha Lopez is a 37 y.o. 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  Diagnoses:  1.  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  - Status post right ventricle to pulmonary artery conduit replacement in  with a 22 mm porcine valved conduit.  - Currently with mild to at worst moderate pulmonary stenosis with estimated right ventricular pressures less than half systemic and likely moderate pulmonary insufficiency.    - Mild dilation of the aortic root with mild aortic insufficiency.  This is typical with her disease and needs to be followed long-term.  - very reassuring CPX 2023, MRI 2023 pending  2.  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  3.  2 healthy children  4.  Recurrent pneumonia, left lower lobe    Discussion:  On my review of the MRI, she looks great.  However, I am awaiting the official reading.  I can not find a cardiac reason for her recurrent left lower lobe pneumonia.  She does have a very large right pulmonary artery that could potentially cause some airway compression, but this would not explain left lower lobe disease.  Her CPX testing is quite reassuring.    Plan:  1.  Continue baby aspirin  2.  Endocarditis prophylaxis is recommended.    3.  Follow-up 1 year with echo and holter  4.  Healthy diet.  Regular low to moderate intensity exercise.  5.  Follow up MRI from today    Interval history:  I last saw her a year ago.  She has done very well since that time.  She is completely asymptomatic  from a cardiovascular standpoint with no history of palpitations, chest pain, syncope, near syncope, cyanosis, or edema.  She exercises regularly.  She has had left lower lobe pneumonia twice in the past year.  She was found to have no pneumococcal antibodies, and she is seen immunology and getting repeat vaccinations.  She does think that has helped.    PMH:  She underwent a right sided BT shunt at 2 months of age followed by a Rastelli operation at 3 years of age, both performed by Dr. Deleon in [facility].  She subsequently underwent replacement of the right ventricle to pulmonary artery conduit with a 22 mm porcine valve conduit on June 7, 2007 in [place], also by Dr. Deleon.  In the past, she was followed at [place] by Dr. Olivares.    The review of systems is as noted above. It is otherwise negative for other symptoms related to the general, neurological, psychiatric, endocrine, gastrointestinal, genitourinary, respiratory, dermatologic, musculoskeletal, hematologic, and immunologic systems.    Past Medical History:   Diagnosis Date    Double outlet right ventricle     Migraine headache     Pneumonia     April 2019     Single live birth      Past Surgical History:   Procedure Laterality Date    ROSENDO PROCEDURE      BT shunt in [facility] by Dr. Deleon at 2 months of age    INTRAUTERINE DEVICE INSERTION      [place], 03/08/2019    NASAL SEPTUM SURGERY      RASTELLI PROCEDURE      in [facility] by Dr. Deleon at 3 years of age    RV to PA conduit  06/07/2007    at [place] by Dr. Deleon - 22mm porcine valved conduit     Family History   Problem Relation Age of Onset    Breast cancer Maternal Aunt 40        bilateral mastectomy    Stroke Maternal Grandmother     Stroke Maternal Grandfather     Colon cancer Neg Hx     Diabetes Neg Hx     Hypertension Neg Hx     Ovarian cancer Neg Hx     Congenital heart disease Neg Hx      Social History     Socioeconomic History    Marital status:  "   Tobacco Use    Smoking status: Never    Smokeless tobacco: Never   Substance and Sexual Activity    Alcohol use: No    Drug use: No    Sexual activity: Yes     Partners: Male     Birth control/protection: OCP   Social History Narrative    She has a PhD from [employer].  She is  and lives in [place].  Her  is an internist at [employer].  She also works at [employer] [place].     Current Outpatient Medications on File Prior to Visit   Medication Sig Dispense Refill    amoxicillin (AMOXIL) 500 MG capsule Take 500 mg by mouth 3 (three) times daily.      aspirin 81 MG Chew Take 1 tablet (81 mg total) by mouth once daily.  0     Current Facility-Administered Medications on File Prior to Visit   Medication Dose Route Frequency Provider Last Rate Last Admin    [COMPLETED] gadobenate dimeglumine (MULTIHANCE) injection 24 mL  24 mL Intravenous ONCE PRN Be Levine MD   24 mL at 04/27/23 0915     Review of patient's allergies indicates:  No Known Allergies    /73 (BP Location: Left arm, Patient Position: Sitting)   Pulse 95   Ht 5' 8.31" (1.735 m)   Wt 57.2 kg (126 lb 1.7 oz)   SpO2 95%   BMI 19.00 kg/m²   In general, she is a very healthy-appearing nondysmorphic female in no apparent distress.  The eyes, nares, and oropharynx are clear.  Eyelids and conjunctiva are normal without drainage or erythema.  Pupils equal and round bilaterally.  The head is normocephalic and atraumatic.  The neck is supple without jugular venous distention or thyroid enlargement.  The lungs are clear to auscultation bilaterally.  There is a well-healed right thoracotomy and median sternotomy scar.  The first heart sound is normal.  The second heart sound is fixed and split.  There is a grade 2/6 systolic ejection murmur heard best at the upper left sternal border with radiation to the lung fields bilaterally.  There is a 1/6 short diastolic murmur as well.  The abdominal exam is benign " without hepatosplenomegaly, tenderness.  She has a gravid uterus.  Pulses are normal in all 4 extremities with brisk capillary refill and no clubbing, cyanosis, or edema.  No rashes are noted.    I personally reviewed the following tests performed today and my interpretation follows:  CPX today:  Normal functional status associated with a borderline reduced breathing reserve, normal oxygen stauration, an adequate effort, and a normal AT. No clinically significant functional impairment.  During stress, the following significant arrhythmias were observed: rare PVCs Couplet.  There was no ST segment deviation noted during stress.  The test was stopped because the patient experienced fatigue.  The patient's exercise capacity was normal.  The ECG portion of this study is negative for myocardial ischemia.  The peak VO2 was 33.5 ml/kg/min which is 95.17% of predicted equating to a functional capacity of 9.57 METS indicating normal functional status.     Cardiac MRI 2019:  RVEDV: 165 cc  RVEDV: 97cc/m2 for BSA (nl )  RVESV:  84cc  RVESV: 50cc/m2 for BSA (nl 6-54)  RVEF:  49%  Stroke volume:  81 cc by ventricular trace  Left ventricle:    The left ventricular systolic volume is increased. There is abnormal septal motion.  The calculated LVEF is 50%. Visually LV systolic function is normal.   LVEDV: 147cc  LVEDV: 87 cc/m2 for BSA (nl 51-95)  LVESV:  74 cc  LVESV: 44 cc/m2 for BSA (nl 11-35)  LVEF: 50 %  Stroke volume: 73cc by ventricular trace  Normal RV volumes with RVEF of 49%  RV to PA conduit with the smallest dimension of 2cm2 with dimension of 15-17mm. There is moderate stenosis with a peak systolic velocity of 2.5 m/sec and moderate PI with regurgitation volume of 20cc with regurgitation fraction of 26%  Dilated right PA with a hypoplastic left PA (the flows are greater in the Right PA than the left PA by Q flow  Increased LV systolic volume with LVEF of 50%  Dilated sinus of Valsalva with maximum measurement of  41mm.  Head vessel branching:the order is right brachiocephalic with right carotid and right subclavian (the vertebral arises from the right subclavian), followed by the left carotid, possible truncated left vertebral, and the left subclavian. There is no comprise to the right or left subclavian with history of right BT shunt. There is however truncated left vertebral.    I also reviewed a cardiac MRI performed February 16, 2016 in [place].  Excellent biventricular function is noted.  There is no significant enlargement of the right ventricle.  Measurements actually suggest a right ventricular volume less than the left ventricular volume, although I do not believe this.  Unrestricted pulmonary valve insufficiency is noted.  The branch pulmonary arteries look good.  Very mild dilation of the aortic root is noted with a diameter less than 4 cm.    Thank you for referring this patient to our clinic.  Please call with any questions.    Sincerely,        Be Levine MD  Pediatric Cardiology  Adult Congenital Heart Disease  Pediatric Heart Failure and Transplantation  [facility] [address] [place]  [address]  [phone]

## 2023-04-28 ENCOUNTER — PATIENT MESSAGE (OUTPATIENT)
Dept: CARDIOLOGY | Facility: CLINIC | Age: 38
End: 2023-04-28
Payer: COMMERCIAL

## 2024-01-19 ENCOUNTER — PATIENT MESSAGE (OUTPATIENT)
Dept: CARDIOLOGY | Facility: CLINIC | Age: 39
End: 2024-01-19
Payer: COMMERCIAL

## 2024-02-02 ENCOUNTER — PATIENT MESSAGE (OUTPATIENT)
Dept: CARDIOLOGY | Facility: CLINIC | Age: 39
End: 2024-02-02
Payer: COMMERCIAL

## 2024-02-02 DIAGNOSIS — Q24.9 ADULT CONGENITAL HEART DISEASE: ICD-10-CM

## 2024-02-02 DIAGNOSIS — Z95.2 PULMONARY VALVE REPLACED: ICD-10-CM

## 2024-02-02 DIAGNOSIS — I37.0 NONRHEUMATIC PULMONARY VALVE STENOSIS: ICD-10-CM

## 2024-02-02 DIAGNOSIS — Z98.890 PERSONAL HISTORY OF SURGERY TO HEART AND GREAT VESSELS, PRESENTING HAZARDS TO HEALTH: ICD-10-CM

## 2024-02-02 DIAGNOSIS — Q20.1 DOUBLE OUTLET RIGHT VENTRICLE: Primary | ICD-10-CM

## 2024-02-21 ENCOUNTER — PATIENT MESSAGE (OUTPATIENT)
Dept: CARDIOLOGY | Facility: CLINIC | Age: 39
End: 2024-02-21
Payer: COMMERCIAL

## 2024-11-28 ENCOUNTER — PATIENT MESSAGE (OUTPATIENT)
Dept: CARDIOLOGY | Facility: CLINIC | Age: 39
End: 2024-11-28
Payer: COMMERCIAL

## 2024-12-02 DIAGNOSIS — Q24.9 ADULT CONGENITAL HEART DISEASE: ICD-10-CM

## 2024-12-02 DIAGNOSIS — Z95.2 PULMONARY VALVE REPLACED: ICD-10-CM

## 2024-12-02 DIAGNOSIS — Q20.1 DOUBLE OUTLET RIGHT VENTRICLE: Primary | ICD-10-CM

## 2024-12-02 DIAGNOSIS — Z98.890 PERSONAL HISTORY OF SURGERY TO HEART AND GREAT VESSELS, PRESENTING HAZARDS TO HEALTH: ICD-10-CM

## 2024-12-02 DIAGNOSIS — I37.0 NONRHEUMATIC PULMONARY VALVE STENOSIS: ICD-10-CM

## 2025-02-06 ENCOUNTER — HOSPITAL ENCOUNTER (OUTPATIENT)
Dept: PEDIATRIC CARDIOLOGY | Facility: HOSPITAL | Age: 40
Discharge: HOME | End: 2025-02-06
Attending: PEDIATRICS
Payer: COMMERCIAL

## 2025-02-06 ENCOUNTER — HOSPITAL ENCOUNTER (OUTPATIENT)
Dept: CARDIOLOGY | Facility: HOSPITAL | Age: 40
Discharge: HOME | End: 2025-02-06
Attending: PEDIATRICS
Payer: COMMERCIAL

## 2025-02-06 ENCOUNTER — OFFICE VISIT (OUTPATIENT)
Dept: CARDIOLOGY | Facility: CLINIC | Age: 40
End: 2025-02-06
Payer: COMMERCIAL

## 2025-02-06 ENCOUNTER — HOSPITAL ENCOUNTER (OUTPATIENT)
Dept: CARDIOLOGY | Facility: CLINIC | Age: 40
Discharge: HOME | End: 2025-02-06
Payer: COMMERCIAL

## 2025-02-06 VITALS
OXYGEN SATURATION: 100 % | HEIGHT: 68 IN | WEIGHT: 132.94 LBS | DIASTOLIC BLOOD PRESSURE: 70 MMHG | HEART RATE: 76 BPM | SYSTOLIC BLOOD PRESSURE: 122 MMHG | BODY MASS INDEX: 20.15 KG/M2

## 2025-02-06 DIAGNOSIS — Z98.890 PERSONAL HISTORY OF SURGERY TO HEART AND GREAT VESSELS, PRESENTING HAZARDS TO HEALTH: ICD-10-CM

## 2025-02-06 DIAGNOSIS — Q20.1 DOUBLE OUTLET RIGHT VENTRICLE: ICD-10-CM

## 2025-02-06 DIAGNOSIS — Z95.2 PULMONARY VALVE REPLACED: ICD-10-CM

## 2025-02-06 DIAGNOSIS — I37.0 NONRHEUMATIC PULMONARY VALVE STENOSIS: ICD-10-CM

## 2025-02-06 DIAGNOSIS — Q24.9 ADULT CONGENITAL HEART DISEASE: ICD-10-CM

## 2025-02-06 DIAGNOSIS — Q20.1 DOUBLE OUTLET RIGHT VENTRICLE: Primary | ICD-10-CM

## 2025-02-06 LAB
ASCENDING AORTA: 3.81 CM
AV AREA BY CONTINUOUS VTI: 5.2 CM2
AV INDEX (PROSTH): 0.94
AV LVOT MEAN GRADIENT: 2 MMHG
AV LVOT PEAK GRADIENT: 4 MMHG
AV MEAN GRADIENT: 4 MMHG
AV PEAK GRADIENT: 7 MMHG
AV VALVE AREA BY VELOCITY RATIO: 4.4 CM²
AV VALVE AREA: 5.4 CM2
AV VELOCITY RATIO: 0.77
CV ECHO LV RWT: 0.36 CM
DOP CALC AO PEAK VEL: 1.3 M/S
DOP CALC AO VTI: 25.6 CM
DOP CALC LVOT AREA: 5.7 CM2
DOP CALC LVOT DIAMETER: 2.7 CM
DOP CALC LVOT PEAK VEL: 1 M/S
DOP CALC LVOT STROKE VOLUME: 137.9 CM3
DOP CALC RVOT VTI: 53.67 CM
DOP CALCLVOT PEAK VEL VTI: 24.1 CM
E WAVE DECELERATION TIME: 178 MS
E/A RATIO: 1.98
E/E' RATIO: 6 M/S
ECHO EF ESTIMATED: 54 %
ECHO LV POSTERIOR WALL: 0.8 CM (ref 0.6–1.1)
FRACTIONAL SHORTENING: 26.7 % (ref 28–44)
INTERVENTRICULAR SEPTUM: 0.8 CM (ref 0.6–1.1)
LA MAJOR: 4.3 CM
LA MINOR: 3.1 CM
LA WIDTH: 3.1 CM
LEFT ATRIUM SIZE: 3.4 CM
LEFT ATRIUM VOLUME: 32 CM3
LEFT INTERNAL DIMENSION IN SYSTOLE: 3.3 CM (ref 2.1–4)
LEFT VENTRICLE DIASTOLIC VOLUME: 94.44 ML
LEFT VENTRICLE SYSTOLIC VOLUME: 43.41 ML
LEFT VENTRICULAR INTERNAL DIMENSION IN DIASTOLE: 4.5 CM (ref 3.5–6)
LEFT VENTRICULAR MASS: 113.6 G
LV LATERAL E/E' RATIO: 4.5
LV SEPTAL E/E' RATIO: 10.6
MV A" WAVE DURATION": 110.37 MS
MV PEAK A VEL: 0.43 M/S
MV PEAK E VEL: 0.85 M/S
OHS CV RV/LV RATIO: 1 CM
OHS QRS DURATION: 100 MS
OHS QTC CALCULATION: 451 MS
PISA TR MAX VEL: 3.4 M/S
PULM VEIN A" WAVE DURATION": 110.37 MS
PULM VEIN S/D RATIO: 0.65
PULMONIC VEIN PEAK A VELOCITY: 0.3 M/S
PV MEAN GRADIENT: 21 MMHG
PV MV: 2.11 M/S
PV PEAK D VEL: 0.57 M/S
PV PEAK GRADIENT: 32 MMHG
PV PEAK S VEL: 0.37 M/S
PV PEAK VELOCITY: 2.81 M/S
RA MAJOR: 4.92 CM
RA WIDTH: 4.54 CM
RIGHT VENTRICLE DIASTOLIC BASEL DIMENSION: 4.5 CM
RV TISSUE DOPPLER FREE WALL SYSTOLIC VELOCITY 1 (APICAL 4 CHAMBER VIEW): 4.88 CM/S
SINUS: 4 CM
STJ: 2.78 CM
TDI LATERAL: 0.19 M/S
TDI SEPTAL: 0.08 M/S
TDI: 0.14 M/S
TR MAX PG: 46 MMHG
TRICUSPID ANNULAR PLANE SYSTOLIC EXCURSION: 1.41 CM
TV PEAK GRADIENT: 46 MMHG

## 2025-02-06 PROCEDURE — 99214 OFFICE O/P EST MOD 30 MIN: CPT | Mod: S$GLB,,, | Performed by: PEDIATRICS

## 2025-02-06 PROCEDURE — 1160F RVW MEDS BY RX/DR IN RCRD: CPT | Mod: CPTII,S$GLB,, | Performed by: PEDIATRICS

## 2025-02-06 PROCEDURE — 93005 ELECTROCARDIOGRAM TRACING: CPT | Mod: S$GLB,,, | Performed by: PEDIATRICS

## 2025-02-06 PROCEDURE — 93010 ELECTROCARDIOGRAM REPORT: CPT | Mod: S$GLB,,, | Performed by: INTERNAL MEDICINE

## 2025-02-06 PROCEDURE — 3074F SYST BP LT 130 MM HG: CPT | Mod: CPTII,S$GLB,, | Performed by: PEDIATRICS

## 2025-02-06 PROCEDURE — 93242 EXT ECG>48HR<7D RECORDING: CPT

## 2025-02-06 PROCEDURE — 93325 DOPPLER ECHO COLOR FLOW MAPG: CPT

## 2025-02-06 PROCEDURE — 1159F MED LIST DOCD IN RCRD: CPT | Mod: CPTII,S$GLB,, | Performed by: PEDIATRICS

## 2025-02-06 PROCEDURE — 93244 EXT ECG>48HR<7D REV&INTERPJ: CPT | Mod: XE,,, | Performed by: PEDIATRICS

## 2025-02-06 PROCEDURE — 3008F BODY MASS INDEX DOCD: CPT | Mod: CPTII,S$GLB,, | Performed by: PEDIATRICS

## 2025-02-06 PROCEDURE — 99999 PR PBB SHADOW E&M-EST. PATIENT-LVL III: CPT | Mod: PBBFAC,,, | Performed by: PEDIATRICS

## 2025-02-06 PROCEDURE — 93303 ECHO TRANSTHORACIC: CPT | Mod: 26,,, | Performed by: PEDIATRICS

## 2025-02-06 PROCEDURE — 3078F DIAST BP <80 MM HG: CPT | Mod: CPTII,S$GLB,, | Performed by: PEDIATRICS

## 2025-02-06 PROCEDURE — 93320 DOPPLER ECHO COMPLETE: CPT | Mod: 26,,, | Performed by: PEDIATRICS

## 2025-02-06 PROCEDURE — 93325 DOPPLER ECHO COLOR FLOW MAPG: CPT | Mod: 26,,, | Performed by: PEDIATRICS

## 2025-02-06 NOTE — PROGRESS NOTES
2025    re:Samantha Lopez  :1985    Steve Arevalo MD  [address] [facility] [address]    [facility]     Samantha Lopez is a 39 y.o. 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  Diagnoses:  1.  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  - Status post right ventricle to pulmonary artery conduit replacement in  with a 22 mm porcine valved conduit.  - Currently with unchanged mild to moderate pulmonary stenosis with estimated right ventricular pressures less than half systemic and likely moderate pulmonary insufficiency.    - Mild dilation of the aortic root with mild aortic insufficiency.  This is typical with her disease and needs to be followed long-term.  - very reassuring CPX 2023, MRI 2023 also very reassuring.  2.  2 healthy children  3.  Hives with gadobenate contrast on  MRI    Discussion:  She looks great from my standpoint.  Specifically, the stenosis across the pulmonary valve is unchanged.  Her MRI almost 2 years ago was reassuring with moderate pulmonary insufficiency but no significant right ventricular enlargement and normal biventricular function.  The echocardiogram looks unchanged.  She is asymptomatic from a cardiovascular standpoint.  She will need a new pulmonary valve in the future, but I see no indication for replacement at present.  I suspect she will be a good candidate for a catheter based valve.    Plan:  1.  Continue baby aspirin  2.  Endocarditis prophylaxis is recommended.    3.  Follow-up 1 year with echo and CPX.  We will likely get a repeat cardiac MRI the next year, but we will  need to discuss the high if she had with contrast with the last 1.    4.  Healthy diet.  Regular low to moderate intensity exercise.  5.  Holter monitor today    Interval history:  She has done well since I last saw her.  She is actually more active than she used to be.  She has been participating in tennis clinics, and getting more aerobic exercise than she used to.  No worsening exercise tolerance.  No chest pain or shortness of breath.  No syncope or near-syncope.  No edema.  She does have occasional very brief palpitations.    PMH:  She underwent a right sided BT shunt at 2 months of age followed by a Rastelli operation at 3 years of age, both performed by Dr. Deleon in [facility].  She subsequently underwent replacement of the right ventricle to pulmonary artery conduit with a 22 mm porcine valve conduit on June 7, 2007 in [place], also by Dr. Deleon.  In the past, she was followed at [place] by Dr. Olivares.    The review of systems is as noted above. It is otherwise negative for other symptoms related to the general, neurological, psychiatric, endocrine, gastrointestinal, genitourinary, respiratory, dermatologic, musculoskeletal, hematologic, and immunologic systems.    Past Medical History:   Diagnosis Date    Double outlet right ventricle     Migraine headache     Pneumonia     April 2019      Past Surgical History:   Procedure Laterality Date    ROSENDO PROCEDURE      BT shunt in [facility] by Dr. Deelon at 2 months of age    INTRAUTERINE DEVICE INSERTION      [place], 03/08/2019    NASAL SEPTUM SURGERY      RASTELLI PROCEDURE      in [facility] by Dr. Deleon at 3 years of age    RV to PA conduit  06/07/2007    at [place] by Dr. Deleon - 22mm porcine valved conduit     Family History   Problem Relation Name Age of Onset    Breast cancer Maternal Aunt  40        bilateral mastectomy    Stroke Maternal Grandmother      Stroke Maternal Grandfather      Colon cancer Neg Hx       Diabetes Neg Hx      Hypertension Neg Hx      Ovarian cancer Neg Hx      Congenital heart disease Neg Hx       Social History     Socioeconomic History    Marital status:    Tobacco Use    Smoking status: Never    Smokeless tobacco: Never   Substance and Sexual Activity    Alcohol use: No    Drug use: No    Sexual activity: Yes     Partners: Male     Birth control/protection: OCP   Social History Narrative    She has a PhD from [employer].  She is  and lives in [place].  Her  is an internist at [employer].  She also works at [employer].     Social Drivers of Health     Financial Resource Strain: Low Risk  (2/4/2025)    Overall Financial Resource Strain (CARDIA)     Difficulty of Paying Living Expenses: Not hard at all   Food Insecurity: No Food Insecurity (2/4/2025)    Hunger Vital Sign     Worried About Running Out of Food in the Last Year: Never true     Ran Out of Food in the Last Year: Never true   Physical Activity: Sufficiently Active (2/4/2025)    Exercise Vital Sign     Days of Exercise per Week: 5 days     Minutes of Exercise per Session: 40 min   Stress: No Stress Concern Present (2/4/2025)    [employer] [employer] - Occupational Stress Questionnaire     Feeling of Stress : Only a little   Housing Stability: Unknown (2/4/2025)    Housing Stability Vital Sign     Unable to Pay for Housing in the Last Year: No     Current Outpatient Medications on File Prior to Visit   Medication Sig Dispense Refill    aspirin 81 MG Chew Take 1 tablet (81 mg total) by mouth once daily.  0    tretinoin (RETIN-A) 0.025 % cream APPLY A THIN COAT TO ENTIRE FACE EVERY OTHER NIGHT. INCREASE TO NIGHTLY AS TOLERATED      [DISCONTINUED] hydrocortisone 2.5 % cream APPLY TOPICALLY TO THE AFFECTED AREA TWICE DAILY FOR 2 WEEKS       No current facility-administered medications on file prior to visit.     Review of patient's allergies indicates:  No Known Allergies    BP  "122/70 (BP Location: Right arm, Patient Position: Sitting)   Pulse 76   Ht 5' 8" (1.727 m)   Wt 60.3 kg (132 lb 15 oz)   SpO2 100%   BMI 20.21 kg/m²   In general, she is a very healthy-appearing nondysmorphic female in no apparent distress.  The eyes, nares, and oropharynx are clear.  Eyelids and conjunctiva are normal without drainage or erythema.  Pupils equal and round bilaterally.  The head is normocephalic and atraumatic.  The neck is supple without jugular venous distention or thyroid enlargement.  The lungs are clear to auscultation bilaterally.  There is a well-healed right thoracotomy and median sternotomy scar.  The first heart sound is normal.  The second heart sound is fixed and split.  There is a grade 2/6 systolic ejection murmur heard best at the upper left sternal border with radiation to the lung fields bilaterally.  There is a 1/6 short diastolic murmur as well.  The abdominal exam is benign without hepatosplenomegaly, tenderness.  She has a gravid uterus.  Pulses are normal in all 4 extremities with brisk capillary refill and no clubbing, cyanosis, or edema.  No rashes are noted.    I personally reviewed the following tests performed today and my interpretation follows:  The EKG in clinic today reveals normal sinus rhythm with a heart rate around 60 beats per minute.  Biatrial enlargement.  No right bundle branch block.  QRS duration is about 100 milliseconds.    Results for orders placed during the hospital encounter of 02/06/25    Echo    Interpretation Summary  CONGENITAL CARDIAC HISTORY:  DORV, d-TGA and pulmonary stenosis.  S/P  R modified BT shunt 2 months old.  S/P Sukhwindertelli - 3 yrs old in [facility].  S/P 22mm Porcine valved [facility]  - All interventions by Miguel Ziegler    SEGMENTAL CARDIAC CONNECTIONS (previously demonstrated):  Abdominal situs is solitus.  Atrial situs is normal.  Atrioventricular concordance.  Grossly normal structure of tricuspid and " mitral valves.  RV dilation and RV hypertrophy.  Double outlet right ventricle with transposed great vessels  S/P intracardiac baffle and RV to PA conduit.  Aortic valve is normal.  Ventricular loop: D-loop.  Cardiac position is Levocardia.  Appears to have branching pattern consistent with left aortic arch.      IMPRESSION:  History of DORV with malposed great vessels and PS S/P Rastelli:  Moderately dilated inferior vena cava and right atrium.  Qualitative impression of at least mild dilation of the right atrium.  Structurally normal tricuspid valve.  Mild tricuspid insufficiency at velocity suggesting right ventricular pressure >50 mmHg plus right atrial pressure based on incompletely defined Doppler profile.  Moderate to severe dilation and mild hypertrophy of the right ventricle with preserved systolic function.  No evidence of residual left to right shunt at intracardiac baffle  Valved conduit from RV to pulmonary arteries with peak velocity >3.4 m/sec., mean gradient >30 mmHg and minimally restricted insufficiency.  Limited images demonstrate qualitatively normal size, confluent proximal pulmonary branches.  Left atrium is normal in size with prominent left atrial appendage.  Qualitative impression of normal mitral valve size and structure with no stenosis and trivial insufficiency.  Left ventricle is normal in size.  Prominent paradoxical septal motion with good movement of the left ventricular free wall, SF =33% and EF qualitatively estimated 50 -55% from apical  Views  Global left ventricular longitudinal strain abnormal - four-chamber strain measured -13.2%.  Limited images with no acceleration across VSD and intracardiac conduit to aortic valve.  Mild eccentric jet of aortic insufficiency .  Aortic dimensions:  Sinuses of Valsalva = 40 mm.  ST junction              = 33 mm.  Ascending aorta      = 41 mm.  Normal-sized distal transverse arch and descending aorta with no evidence of coarctation color  Doppler demonstrating early diastolic flow reversal.  Left aortic arch branching pattern is demonstrated.  There is no obvious pericardial effusion.    CPX 2023:  Normal functional status associated with a borderline reduced breathing reserve, normal oxygen stauration, an adequate effort, and a normal AT. No clinically significant functional impairment.  During stress, the following significant arrhythmias were observed: rare PVCs Couplet.  There was no ST segment deviation noted during stress.  The test was stopped because the patient experienced fatigue.  The patient's exercise capacity was normal.  The ECG portion of this study is negative for myocardial ischemia.  The peak VO2 was 33.5 ml/kg/min which is 95.17% of predicted equating to a functional capacity of 9.57 METS indicating normal functional status.     MRI 2023:  No left SVC. Innominate vein drains into right SVC.   Mild right atrial enlargement.    Normal right ventricular volumes. (RVEDV 92 cc/m2 for BSA, RVESV 42 cc/m2 for BSA).  RVEF 53 %.  RV-PA conduit with long segment narrowing, consistent with likely calcification. Pulmonary insufficiency with regurgitant fraction of 33% and regurgitation volume of 31 cc. There is increased velocity to 3.3 m/sec.  Dilated RPA with normally sized LPA, flow discrepancy with 70% to the RPA and 30% to the LPA.   Mildly increased LV systolic volume with LVEF 54 %.  Dilated aortic root (43mm) and ascending aorta (40mm).      Cardiac MRI 2019:  RVEDV: 165 cc  RVEDV: 97cc/m2 for BSA (nl )  RVESV:  84cc  RVESV: 50cc/m2 for BSA (nl 6-54)  RVEF:  49%  Stroke volume:  81 cc by ventricular trace  Left ventricle:    The left ventricular systolic volume is increased. There is abnormal septal motion.  The calculated LVEF is 50%. Visually LV systolic function is normal.   LVEDV: 147cc  LVEDV: 87 cc/m2 for BSA (nl 51-95)  LVESV:  74 cc  LVESV: 44 cc/m2 for BSA (nl 11-35)  LVEF: 50 %  Stroke volume: 73cc by ventricular  trace  Normal RV volumes with RVEF of 49%  RV to PA conduit with the smallest dimension of 2cm2 with dimension of 15-17mm. There is moderate stenosis with a peak systolic velocity of 2.5 m/sec and moderate PI with regurgitation volume of 20cc with regurgitation fraction of 26%  Dilated right PA with a hypoplastic left PA (the flows are greater in the Right PA than the left PA by Q flow  Increased LV systolic volume with LVEF of 50%  Dilated sinus of Valsalva with maximum measurement of 41mm.  Head vessel branching:the order is right brachiocephalic with right carotid and right subclavian (the vertebral arises from the right subclavian), followed by the left carotid, possible truncated left vertebral, and the left subclavian. There is no comprise to the right or left subclavian with history of right BT shunt. There is however truncated left vertebral.    I also reviewed a cardiac MRI performed February 16, 2016 in [place].  Excellent biventricular function is noted.  There is no significant enlargement of the right ventricle.  Measurements actually suggest a right ventricular volume less than the left ventricular volume, although I do not believe this.  Unrestricted pulmonary valve insufficiency is noted.  The branch pulmonary arteries look good.  Very mild dilation of the aortic root is noted with a diameter less than 4 cm.    Thank you for referring this patient to our clinic.  Please call with any questions.    Sincerely,        Be Levine MD  Pediatric Cardiology  Adult Congenital Heart Disease  Pediatric Heart Failure and Transplantation  [facility] [address] [place]  [address]  [phone]

## 2025-02-20 LAB
OHS CV EVENT MONITOR DAY: 3
OHS CV HOLTER HOOKUP DATE: NORMAL
OHS CV HOLTER HOOKUP TIME: NORMAL
OHS CV HOLTER LENGTH DECIMAL HOURS: 89
OHS CV HOLTER LENGTH HOURS: 17
OHS CV HOLTER LENGTH MINUTES: 0
OHS CV HOLTER SCAN DATE: NORMAL
OHS CV HOLTER SINUS AVERAGE HR: 75 BPM
OHS CV HOLTER SINUS MAX HR: 155 BPM
OHS CV HOLTER SINUS MIN HR: 34 BPM
OHS CV HOLTER STUDY END DATE: NORMAL
OHS CV HOLTER STUDY END TIME: NORMAL

## 2025-03-10 ENCOUNTER — PATIENT MESSAGE (OUTPATIENT)
Dept: CARDIOLOGY | Facility: CLINIC | Age: 40
End: 2025-03-10
Payer: COMMERCIAL